# Patient Record
Sex: MALE | HISPANIC OR LATINO | Employment: FULL TIME | URBAN - METROPOLITAN AREA
[De-identification: names, ages, dates, MRNs, and addresses within clinical notes are randomized per-mention and may not be internally consistent; named-entity substitution may affect disease eponyms.]

---

## 2018-01-24 ENCOUNTER — NON-PROVIDER VISIT (OUTPATIENT)
Dept: OCCUPATIONAL MEDICINE | Facility: CLINIC | Age: 23
End: 2018-01-24

## 2018-01-24 DIAGNOSIS — Z02.1 DRUG TESTING, PRE-EMPLOYMENT: ICD-10-CM

## 2018-01-24 DIAGNOSIS — Z02.1 PRE-EMPLOYMENT DRUG SCREENING: ICD-10-CM

## 2018-01-24 LAB
AMP AMPHETAMINE: NORMAL
BAR BARBITURATES: NORMAL
BZO BENZODIAZEPINES: NORMAL
COC COCAINE: NORMAL
INT CON NEG: NORMAL
INT CON POS: NORMAL
MDMA ECSTASY: NORMAL
MET METHAMPHETAMINES: NORMAL
MTD METHADONE: NORMAL
OPI OPIATES: NORMAL
OXY OXYCODONE: NORMAL
PCP PHENCYCLIDINE: NORMAL
POC URINE DRUG SCREEN OCDRS: NEGATIVE
THC: NORMAL

## 2018-01-24 PROCEDURE — 80305 DRUG TEST PRSMV DIR OPT OBS: CPT | Performed by: PREVENTIVE MEDICINE

## 2019-11-20 ENCOUNTER — NON-PROVIDER VISIT (OUTPATIENT)
Dept: OCCUPATIONAL MEDICINE | Facility: CLINIC | Age: 24
End: 2019-11-20

## 2019-11-20 DIAGNOSIS — Z02.1 PRE-EMPLOYMENT DRUG SCREENING: ICD-10-CM

## 2019-11-20 LAB
AMP AMPHETAMINE: NORMAL
COC COCAINE: NORMAL
INT CON NEG: NORMAL
INT CON POS: NORMAL
MET METHAMPHETAMINES: NORMAL
OPI OPIATES: NORMAL
PCP PHENCYCLIDINE: NORMAL
POC DRUG COMMENT 753798-OCCUPATIONAL HEALTH: NORMAL
THC: NORMAL

## 2019-11-20 PROCEDURE — 80305 DRUG TEST PRSMV DIR OPT OBS: CPT | Performed by: NURSE PRACTITIONER

## 2021-02-17 ENCOUNTER — NON-PROVIDER VISIT (OUTPATIENT)
Dept: OCCUPATIONAL MEDICINE | Facility: CLINIC | Age: 26
End: 2021-02-17

## 2021-02-17 DIAGNOSIS — Z02.1 PRE-EMPLOYMENT DRUG SCREENING: ICD-10-CM

## 2021-02-17 PROCEDURE — 80305 DRUG TEST PRSMV DIR OPT OBS: CPT | Performed by: PREVENTIVE MEDICINE

## 2021-02-18 LAB
AMP AMPHETAMINE: NORMAL
BAR BARBITURATES: NORMAL
BZO BENZODIAZEPINES: NORMAL
COC COCAINE: NORMAL
INT CON NEG: NORMAL
INT CON POS: NORMAL
MDMA ECSTASY: NORMAL
MET METHAMPHETAMINES: NORMAL
MTD METHADONE: NORMAL
OPI OPIATES: NORMAL
OXY OXYCODONE: NORMAL
PCP PHENCYCLIDINE: NORMAL
POC URINE DRUG SCREEN OCDRS: NORMAL
THC: NORMAL

## 2021-03-12 ENCOUNTER — APPOINTMENT (OUTPATIENT)
Dept: URGENT CARE | Facility: CLINIC | Age: 26
End: 2021-03-12

## 2021-03-16 ENCOUNTER — NURSE TRIAGE (OUTPATIENT)
Dept: HEALTH INFORMATION MANAGEMENT | Facility: OTHER | Age: 26
End: 2021-03-16

## 2021-03-16 ENCOUNTER — APPOINTMENT (OUTPATIENT)
Dept: RADIOLOGY | Facility: MEDICAL CENTER | Age: 26
End: 2021-03-16
Attending: EMERGENCY MEDICINE
Payer: MEDICAID

## 2021-03-16 ENCOUNTER — HOSPITAL ENCOUNTER (EMERGENCY)
Facility: MEDICAL CENTER | Age: 26
End: 2021-03-16
Attending: EMERGENCY MEDICINE
Payer: MEDICAID

## 2021-03-16 VITALS
OXYGEN SATURATION: 98 % | SYSTOLIC BLOOD PRESSURE: 123 MMHG | RESPIRATION RATE: 16 BRPM | WEIGHT: 228.62 LBS | HEIGHT: 71 IN | DIASTOLIC BLOOD PRESSURE: 70 MMHG | BODY MASS INDEX: 32.01 KG/M2 | TEMPERATURE: 97.8 F | HEART RATE: 61 BPM

## 2021-03-16 DIAGNOSIS — M25.561 ACUTE PAIN OF RIGHT KNEE: ICD-10-CM

## 2021-03-16 PROCEDURE — 73564 X-RAY EXAM KNEE 4 OR MORE: CPT | Mod: RT

## 2021-03-16 PROCEDURE — 99283 EMERGENCY DEPT VISIT LOW MDM: CPT | Mod: EDC

## 2021-03-16 PROCEDURE — 302875 HCHG BANDAGE ACE 4 OR 6"": Mod: EDC

## 2021-03-16 NOTE — DISCHARGE INSTRUCTIONS
You were seen in the Emergency Department for knee injury.    X-rays showed evidence of fluid in the knee which could indicate ligament injury however there was no evidence of fracture.    Please use 1,000mg of tylenol or 600mg of ibuprofen every 6 hours as needed for pain.  Elevate and ice is much as possible.    Please follow up with orthopedic surgery for outpatient MRI.    Return to the Emergency Department with uncontrolled pain, new numbness or weakness in extremity, or other concerns.

## 2021-03-16 NOTE — ED PROVIDER NOTES
"ED Provider Note    Scribed for Niharika Castro M.D. by Jonathan Aguillon. 3/16/2021  1:09 PM    Means of arrival: Walk-In  History obtained from: Patient  History limited by: None      CHIEF COMPLAINT  Chief Complaint   Patient presents with   • Knee Pain     Right knee pain after wrestling 6 days ago       BÁRBARA Bledsoe is a 25 y.o. male who presents to the Emergency Department for evaluation of right knee pain onset six days ago. Patient states that as he was doing [employer], a teammate grabbed him from behind and they both fell backwards, causing the patient to injure his right knee. Patient states that his symptoms have not improved since onset, prompting him to present to the ED for further evaluation. Patient has associated swelling and decreased range of motion secondary to pain. Denies any numbness or tingling. No alleviating or exacerbating factors reported. Patient has no known allergies.     REVIEW OF SYSTEMS  Pertinent positive include right knee pain, swelling and decreased range of motion secondary to pain. Pertinent negative include numbness or tingling. All other systems reviewed and are negative.    PAST MEDICAL HISTORY  None noted    SOCIAL HISTORY  Social History     Tobacco Use   • Smoking status: Current Every Day Smoker   • Smokeless tobacco: Never Used   Substance and Sexual Activity   • Alcohol use: Not Currently     SURGICAL HISTORY  patient denies any surgical history    CURRENT MEDICATIONS  Home Medications     Reviewed by Olvin Abad R.N. (Registered Nurse) on 03/16/21 at 1251  Med List Status: Not Addressed   Medication Last Dose Status   cetirizine (ZYRTEC) 10 MG TABS  Active                ALLERGIES  No Known Allergies    PHYSICAL EXAM   VITAL SIGNS: /67   Pulse (!) 58   Temp 36.5 °C (97.7 °F) (Temporal)   Resp 16   Ht 1.803 m (5' 11\")   Wt 104 kg (228 lb 9.9 oz)   SpO2 98%   BMI 31.89 kg/m²    Constitutional: Nontoxic appearing,  Alert in no " "apparent distress.  HENT: Normocephalic, Atraumatic. Bilateral external ears normal. Nose normal.  Moist mucous membranes.  Oropharynx clear.  Eyes: Pupils are equal and reactive. Conjunctiva normal.   Neck: Supple, full range of motion  Musculoskeletal: There is mild swelling noted to the right knee. There is mild bruising posterior to the knee. Range of motion of the right knee is limited secondary to pain but intact. Flexion and extension function, as well as motor and sensation, are intact distal to injury.   Skin: Warm, Dry.  No rash.   Neurologic: Alert and oriented x3. Moving all extremities spontaneously without focal deficits.  Psychiatric: Affect normal, Mood normal, Appears appropriate and not intoxicated.    DIAGNOSTIC STUDIES  RADIOLOGY  Personally reviewed by me  DX-KNEE COMPLETE 4+ RIGHT   Final Result      No evidence of acute fracture or dislocation.      Moderate joint effusion.      Further evaluation can be obtained with MRI.                ED COURSE  Vitals:    03/16/21 1239 03/16/21 1249 03/16/21 1358   BP: 127/67  123/70   Pulse: (!) 58  61   Resp: 16  16   Temp: 36.5 °C (97.7 °F)  36.6 °C (97.8 °F)   TempSrc: Temporal  Temporal   SpO2: 98%  98%   Weight:  104 kg (228 lb 9.9 oz)    Height: 1.829 m (6') 1.803 m (5' 11\")          Medications administered:  Medications - No data to display    1:09 PM Patient seen and examined at bedside. The patient presents with right knee pain.  Ordered for DX-knee right to evaluate.     MEDICAL DECISION MAKING  Young otherwise healthy patient who presents with injury to right knee which occurred approximately a week ago.  No other traumatic injuries are identified.  There is no evidence of neurovascular compromise or concern for infectious process such as septic arthritis.  His x-rays do not show fracture however he does have a moderate joint effusion that may be due to ligamentous injury.  Patient will be discharged with symptomatic care and instructions for " outpatient follow-up for MRI and orthopedic follow-up.    1:38 PM - Upon reassessment, patient is resting comfortably with normal vital signs. No new complaints at this time.  Discussed results with patient and/or family as well as importance of primary care/orthopedic follow up. Patient understands plan of care and strict return precautions for new or changing symptoms.     The patient will return for new or worsening symptoms and is stable at the time of discharge.    The patient is referred to a primary physician for blood pressure management, diabetic screening, and for all other preventative health concerns.    DISPOSITION:  Patient will be discharged home in stable condition.    FOLLOW UP:  Tone Sutton M.D.  [address]  [facility] [address]  [phone]    Schedule an appointment as soon as possible for a visit   ORTHOPEDIC FOLLOW UP    [facility], Emergency Dept  [address] [facility] [id number] [phone]    If symptoms worsen      IMPRESSION  (M25.561) Acute pain of right knee    Results, diagnoses, and treatment options were discussed with the patient and/or family. Patient verbalized understanding of plan of care.     Jonathan PEARCE (Lena[place]sage), am scribing for, and in the presence of, Niharika Castro M.D..    Electronically signed by: Jonathan Aguillon ([place]), 3/16/2021    Niharika PEARCE M.D. personally performed the services described in this documentation, as scribed by Jonathan Aguillon in my presence, and it is both accurate and complete.    E     The note accurately reflects work and decisions made by me.  Niharika Castro M.D.  3/16/2021  6:04 PM

## 2021-03-16 NOTE — ED TRIAGE NOTES
"Chief Complaint   Patient presents with   • Knee Pain     Right knee pain after wrestling 6 days ago     /67   Pulse (!) 58   Temp 36.5 °C (97.7 °F) (Temporal)   Resp 16   Ht 1.803 m (5' 11\")   Wt 104 kg (228 lb 9.9 oz)   SpO2 98%   BMI 31.89 kg/m²     26 y/o male presents to ED with complaint of right knee pain. Patient states he was wresting a week ago and his knee got pinned in,'a weird position'.  He states he thought pain would go away. Has not taken any OTC analgesics. Able to ambulate, but has pain.    "

## 2021-03-16 NOTE — ED NOTES
First interaction with patient.  Assumed care at this time.  Patient presents to the ER today for complaint of right knee pain x6 days after falling on it while doing jiu jitsu.  Swelling noted on assessment.  He states that pain intermittently radiates to his calf and heel.  CMS intact.    Gown provided, patient instructed to changed prior to ERP evaluation.  NPO status explained by this RN.  Call light provided.  Chart up for ERP.    This RN provided education to patient about the importance of keeping mask in place over both mouth and nose for entire duration of ER visit.

## 2021-03-16 NOTE — Clinical Note
Raymond Bledsoe was seen and treated in our emergency department on 3/16/2021.  He may return to work on 03/30/2021.       If you have any questions or concerns, please don't hesitate to call.      Niharika Castro M.D.

## 2021-03-16 NOTE — TELEPHONE ENCOUNTER
"Does not have insurance.      Landed on knee, knee pain.          Reason for Disposition  • SEVERE pain (e.g., excruciating)    Additional Information  • Negative: Major bleeding (actively dripping or spurting) that can't be stopped  • Negative: Bullet, stabbed by knife or other serious penetrating wound  • Negative: Looks like a dislocated joint (crooked or deformed)  • Negative: Sounds like a life-threatening emergency to the triager  • Negative: Wound looks infected  • Negative: Can't stand (bear weight) or walk  • Negative: Skin is split open or gaping (length > 1/2 inch or 12 mm)  • Negative: Bleeding won't stop after 10 minutes of direct pressure (using correct technique)  • Negative: Dirt in the wound and not removed after 15 minutes of scrubbing  • Negative: Sounds like a serious injury to the triager  • Negative: Looks infected (e.g., spreading redness, pus, red streak)    Answer Assessment - Initial Assessment Questions  1. MECHANISM: \"How did the injury happen?\" (e.g., twisting injury, direct blow)       Wrestling, landed on knee  2. ONSET: \"When did the injury happen?\" (Minutes or hours ago)     3/10 @ 7:30 p.m.  3. LOCATION: \"Where is the injury located?\"       Right knee, entire knee  4. APPEARANCE of INJURY: \"What does the injury look like?\"       Swollen, bruising  5. SEVERITY: \"Can you put weight on that leg?\" \"Can you walk?\"       Can put weight for a limited amount of time, can barely bend knee, cannot stretch knee out or lift it up, can walk but limps  6. SIZE: For cuts, bruises, or swelling, ask: \"How large is it?\" (e.g., inches or centimeters;  entire joint)       Bruise is size of golf or tennis ball, 1 1/2 times of other knee  7. PAIN: \"Is there pain?\" If so, ask: \"How bad is the pain?\"    (e.g., Scale 1-10; or mild, moderate, severe)      Painful, 9  8. TETANUS: For any breaks in the skin, ask: \"When was the last tetanus booster?\"      NA  9. OTHER SYMPTOMS: \"Do you have any other symptoms?\" " " (e.g., \"pop\" when knee injured, swelling, locking, buckling)       no  10. PREGNANCY: \"Is there any chance you are pregnant?\" \"When was your last menstrual period?\"        NA    Protocols used: KNEE INJURY-A-OH      "

## 2021-03-16 NOTE — ED NOTES
"Raymond Bledsoe has been discharged from the Emergency Room.    Discharge instructions, which include signs and symptoms to monitor at home for, as well as detailed information regarding acute pain of right knee provided.  All questions and concerns addressed at this time.      Patient encouraged to follow up with orthopedics, Dr. More's office contact information with phone number and address provided.    Patient leaves ER in no apparent distress. This RN provided education regarding returning to the ER for any new concerns or changes in patient's condition.      /70   Pulse 61   Temp 36.6 °C (97.8 °F) (Temporal)   Resp 16   Ht 1.803 m (5' 11\")   Wt 104 kg (228 lb 9.9 oz)   SpO2 98%   BMI 31.89 kg/m²     "

## 2021-05-13 ENCOUNTER — APPOINTMENT (OUTPATIENT)
Dept: LAB | Facility: MEDICAL CENTER | Age: 26
End: 2021-05-13
Payer: MEDICAID

## 2021-05-19 ENCOUNTER — APPOINTMENT (OUTPATIENT)
Dept: ADMISSIONS | Facility: MEDICAL CENTER | Age: 26
End: 2021-05-19
Payer: MEDICAID

## 2021-05-20 ENCOUNTER — PRE-ADMISSION TESTING (OUTPATIENT)
Dept: ADMISSIONS | Facility: MEDICAL CENTER | Age: 26
End: 2021-05-20
Attending: ORTHOPAEDIC SURGERY
Payer: MEDICAID

## 2021-05-20 NOTE — PREPROCEDURE INSTRUCTIONS
"Patient present for preadmit appointment: \"Preparing for your Procedure information,\" pain management, patient safety, covid symptoms, self isolating and fasting protocol per anesthesia sheets given to patient with verbal and written instructions. Patient instructed to continue prescribed medications through the day before surgery, instructed to take the following medications the day of surgery per anesthesia protocol: none. Patient instructed to stop blood thinner per MD instruction: n/a. Patient states no reaction to previous anesthesia. Pt reports no s/s of urinary tract infection. Covid appointment scheduled 6/1. Patient educated to call MD's office if any symptoms of Covid appear. Patient understands education and has no other questions or concerns at this time.     Patient coming in on testing line 6/1.     Very difficult for PAR and I to hear patient but patient stated multiple times he did not want to reschedule our Preadmit appointment. Conducted appointment and education to the best of our ability. Sent an email with all education and important reminders to the email on file and educated to call with any questions.   "

## 2021-06-01 ENCOUNTER — APPOINTMENT (OUTPATIENT)
Dept: ADMISSIONS | Facility: MEDICAL CENTER | Age: 26
End: 2021-06-01
Attending: ORTHOPAEDIC SURGERY
Payer: MEDICAID

## 2021-06-01 DIAGNOSIS — Z01.810 PRE-OPERATIVE CARDIOVASCULAR EXAMINATION: ICD-10-CM

## 2021-06-01 DIAGNOSIS — Z01.812 PRE-OPERATIVE LABORATORY EXAMINATION: ICD-10-CM

## 2021-06-01 LAB — EKG IMPRESSION: NORMAL

## 2021-06-01 PROCEDURE — 93005 ELECTROCARDIOGRAM TRACING: CPT

## 2021-06-01 PROCEDURE — U0003 INFECTIOUS AGENT DETECTION BY NUCLEIC ACID (DNA OR RNA); SEVERE ACUTE RESPIRATORY SYNDROME CORONAVIRUS 2 (SARS-COV-2) (CORONAVIRUS DISEASE [COVID-19]), AMPLIFIED PROBE TECHNIQUE, MAKING USE OF HIGH THROUGHPUT TECHNOLOGIES AS DESCRIBED BY CMS-2020-01-R: HCPCS

## 2021-06-01 PROCEDURE — 93010 ELECTROCARDIOGRAM REPORT: CPT | Performed by: INTERNAL MEDICINE

## 2021-06-01 PROCEDURE — U0005 INFEC AGEN DETEC AMPLI PROBE: HCPCS

## 2021-06-01 PROCEDURE — C9803 HOPD COVID-19 SPEC COLLECT: HCPCS

## 2021-06-02 LAB
SARS-COV-2 RNA RESP QL NAA+PROBE: NOTDETECTED
SPECIMEN SOURCE: NORMAL

## 2021-06-04 ENCOUNTER — ANESTHESIA (OUTPATIENT)
Dept: SURGERY | Facility: MEDICAL CENTER | Age: 26
End: 2021-06-04
Payer: MEDICAID

## 2021-06-04 ENCOUNTER — HOSPITAL ENCOUNTER (OUTPATIENT)
Facility: MEDICAL CENTER | Age: 26
End: 2021-06-04
Attending: ORTHOPAEDIC SURGERY | Admitting: ORTHOPAEDIC SURGERY
Payer: MEDICAID

## 2021-06-04 ENCOUNTER — ANESTHESIA EVENT (OUTPATIENT)
Dept: SURGERY | Facility: MEDICAL CENTER | Age: 26
End: 2021-06-04
Payer: MEDICAID

## 2021-06-04 VITALS
HEART RATE: 59 BPM | BODY MASS INDEX: 30.1 KG/M2 | SYSTOLIC BLOOD PRESSURE: 126 MMHG | TEMPERATURE: 97.7 F | WEIGHT: 215 LBS | OXYGEN SATURATION: 97 % | DIASTOLIC BLOOD PRESSURE: 71 MMHG | HEIGHT: 71 IN | RESPIRATION RATE: 16 BRPM

## 2021-06-04 PROCEDURE — 160009 HCHG ANES TIME/MIN: Performed by: ORTHOPAEDIC SURGERY

## 2021-06-04 PROCEDURE — 160046 HCHG PACU - 1ST 60 MINS PHASE II: Performed by: ORTHOPAEDIC SURGERY

## 2021-06-04 PROCEDURE — A9270 NON-COVERED ITEM OR SERVICE: HCPCS | Performed by: ANESTHESIOLOGY

## 2021-06-04 PROCEDURE — 700101 HCHG RX REV CODE 250: Performed by: ANESTHESIOLOGY

## 2021-06-04 PROCEDURE — 160048 HCHG OR STATISTICAL LEVEL 1-5: Performed by: ORTHOPAEDIC SURGERY

## 2021-06-04 PROCEDURE — 502580 HCHG PACK, KNEE ARTHROSCOPY: Performed by: ORTHOPAEDIC SURGERY

## 2021-06-04 PROCEDURE — 160025 RECOVERY II MINUTES (STATS): Performed by: ORTHOPAEDIC SURGERY

## 2021-06-04 PROCEDURE — 160002 HCHG RECOVERY MINUTES (STAT): Performed by: ORTHOPAEDIC SURGERY

## 2021-06-04 PROCEDURE — 160029 HCHG SURGERY MINUTES - 1ST 30 MINS LEVEL 4: Performed by: ORTHOPAEDIC SURGERY

## 2021-06-04 PROCEDURE — 160035 HCHG PACU - 1ST 60 MINS PHASE I: Performed by: ORTHOPAEDIC SURGERY

## 2021-06-04 PROCEDURE — 501838 HCHG SUTURE GENERAL: Performed by: ORTHOPAEDIC SURGERY

## 2021-06-04 PROCEDURE — 700111 HCHG RX REV CODE 636 W/ 250 OVERRIDE (IP): Performed by: ANESTHESIOLOGY

## 2021-06-04 PROCEDURE — 700101 HCHG RX REV CODE 250: Performed by: ORTHOPAEDIC SURGERY

## 2021-06-04 PROCEDURE — 700102 HCHG RX REV CODE 250 W/ 637 OVERRIDE(OP): Performed by: ANESTHESIOLOGY

## 2021-06-04 PROCEDURE — 700105 HCHG RX REV CODE 258: Performed by: ORTHOPAEDIC SURGERY

## 2021-06-04 RX ORDER — HALOPERIDOL 5 MG/ML
1 INJECTION INTRAMUSCULAR
Status: DISCONTINUED | OUTPATIENT
Start: 2021-06-04 | End: 2021-06-04 | Stop reason: HOSPADM

## 2021-06-04 RX ORDER — OXYCODONE HCL 5 MG/5 ML
10 SOLUTION, ORAL ORAL
Status: DISCONTINUED | OUTPATIENT
Start: 2021-06-04 | End: 2021-06-04 | Stop reason: HOSPADM

## 2021-06-04 RX ORDER — ONDANSETRON 2 MG/ML
4 INJECTION INTRAMUSCULAR; INTRAVENOUS
Status: DISCONTINUED | OUTPATIENT
Start: 2021-06-04 | End: 2021-06-04 | Stop reason: HOSPADM

## 2021-06-04 RX ORDER — MEPERIDINE HYDROCHLORIDE 25 MG/ML
12.5 INJECTION INTRAMUSCULAR; INTRAVENOUS; SUBCUTANEOUS
Status: DISCONTINUED | OUTPATIENT
Start: 2021-06-04 | End: 2021-06-04 | Stop reason: HOSPADM

## 2021-06-04 RX ORDER — HYDRALAZINE HYDROCHLORIDE 20 MG/ML
5 INJECTION INTRAMUSCULAR; INTRAVENOUS
Status: DISCONTINUED | OUTPATIENT
Start: 2021-06-04 | End: 2021-06-04 | Stop reason: HOSPADM

## 2021-06-04 RX ORDER — ACETAMINOPHEN 500 MG
TABLET ORAL
Status: DISCONTINUED
Start: 2021-06-04 | End: 2021-06-04 | Stop reason: HOSPADM

## 2021-06-04 RX ORDER — BUPIVACAINE HYDROCHLORIDE AND EPINEPHRINE 2.5; 5 MG/ML; UG/ML
INJECTION, SOLUTION EPIDURAL; INFILTRATION; INTRACAUDAL; PERINEURAL
Status: DISCONTINUED | OUTPATIENT
Start: 2021-06-04 | End: 2021-06-04 | Stop reason: HOSPADM

## 2021-06-04 RX ORDER — HYDROMORPHONE HYDROCHLORIDE 1 MG/ML
0.2 INJECTION, SOLUTION INTRAMUSCULAR; INTRAVENOUS; SUBCUTANEOUS
Status: DISCONTINUED | OUTPATIENT
Start: 2021-06-04 | End: 2021-06-04 | Stop reason: HOSPADM

## 2021-06-04 RX ORDER — SODIUM CHLORIDE, SODIUM LACTATE, POTASSIUM CHLORIDE, CALCIUM CHLORIDE 600; 310; 30; 20 MG/100ML; MG/100ML; MG/100ML; MG/100ML
INJECTION, SOLUTION INTRAVENOUS CONTINUOUS
Status: DISCONTINUED | OUTPATIENT
Start: 2021-06-04 | End: 2021-06-04 | Stop reason: HOSPADM

## 2021-06-04 RX ORDER — DEXAMETHASONE SODIUM PHOSPHATE 4 MG/ML
INJECTION, SOLUTION INTRA-ARTICULAR; INTRALESIONAL; INTRAMUSCULAR; INTRAVENOUS; SOFT TISSUE PRN
Status: DISCONTINUED | OUTPATIENT
Start: 2021-06-04 | End: 2021-06-04 | Stop reason: SURG

## 2021-06-04 RX ORDER — DIPHENHYDRAMINE HYDROCHLORIDE 50 MG/ML
6.25 INJECTION INTRAMUSCULAR; INTRAVENOUS
Status: DISCONTINUED | OUTPATIENT
Start: 2021-06-04 | End: 2021-06-04 | Stop reason: HOSPADM

## 2021-06-04 RX ORDER — CEFAZOLIN SODIUM 1 G/3ML
INJECTION, POWDER, FOR SOLUTION INTRAMUSCULAR; INTRAVENOUS PRN
Status: DISCONTINUED | OUTPATIENT
Start: 2021-06-04 | End: 2021-06-04 | Stop reason: SURG

## 2021-06-04 RX ORDER — HYDROMORPHONE HYDROCHLORIDE 1 MG/ML
0.1 INJECTION, SOLUTION INTRAMUSCULAR; INTRAVENOUS; SUBCUTANEOUS
Status: DISCONTINUED | OUTPATIENT
Start: 2021-06-04 | End: 2021-06-04 | Stop reason: HOSPADM

## 2021-06-04 RX ORDER — ACETAMINOPHEN 500 MG
1000 TABLET ORAL ONCE
Status: COMPLETED | OUTPATIENT
Start: 2021-06-04 | End: 2021-06-04

## 2021-06-04 RX ORDER — HYDROMORPHONE HYDROCHLORIDE 1 MG/ML
0.4 INJECTION, SOLUTION INTRAMUSCULAR; INTRAVENOUS; SUBCUTANEOUS
Status: DISCONTINUED | OUTPATIENT
Start: 2021-06-04 | End: 2021-06-04 | Stop reason: HOSPADM

## 2021-06-04 RX ORDER — ONDANSETRON 2 MG/ML
INJECTION INTRAMUSCULAR; INTRAVENOUS PRN
Status: DISCONTINUED | OUTPATIENT
Start: 2021-06-04 | End: 2021-06-04 | Stop reason: SURG

## 2021-06-04 RX ORDER — MIDAZOLAM HYDROCHLORIDE 1 MG/ML
INJECTION INTRAMUSCULAR; INTRAVENOUS PRN
Status: DISCONTINUED | OUTPATIENT
Start: 2021-06-04 | End: 2021-06-04 | Stop reason: SURG

## 2021-06-04 RX ORDER — LIDOCAINE HYDROCHLORIDE 20 MG/ML
INJECTION, SOLUTION EPIDURAL; INFILTRATION; INTRACAUDAL; PERINEURAL PRN
Status: DISCONTINUED | OUTPATIENT
Start: 2021-06-04 | End: 2021-06-04 | Stop reason: SURG

## 2021-06-04 RX ORDER — SODIUM CHLORIDE, SODIUM LACTATE, POTASSIUM CHLORIDE, CALCIUM CHLORIDE 600; 310; 30; 20 MG/100ML; MG/100ML; MG/100ML; MG/100ML
INJECTION, SOLUTION INTRAVENOUS CONTINUOUS
Status: ACTIVE | OUTPATIENT
Start: 2021-06-04 | End: 2021-06-04

## 2021-06-04 RX ORDER — CELECOXIB 200 MG/1
200 CAPSULE ORAL ONCE
Status: COMPLETED | OUTPATIENT
Start: 2021-06-04 | End: 2021-06-04

## 2021-06-04 RX ORDER — OXYCODONE HCL 5 MG/5 ML
5 SOLUTION, ORAL ORAL
Status: DISCONTINUED | OUTPATIENT
Start: 2021-06-04 | End: 2021-06-04 | Stop reason: HOSPADM

## 2021-06-04 RX ADMIN — LIDOCAINE HYDROCHLORIDE 60 MG: 20 INJECTION, SOLUTION EPIDURAL; INFILTRATION; INTRACAUDAL; PERINEURAL at 14:14

## 2021-06-04 RX ADMIN — MIDAZOLAM HYDROCHLORIDE 2 MG: 1 INJECTION, SOLUTION INTRAMUSCULAR; INTRAVENOUS at 14:11

## 2021-06-04 RX ADMIN — PROPOFOL 200 MG: 10 INJECTION, EMULSION INTRAVENOUS at 14:14

## 2021-06-04 RX ADMIN — CEFAZOLIN 2 G: 1 INJECTION, POWDER, FOR SOLUTION INTRAVENOUS at 14:14

## 2021-06-04 RX ADMIN — FENTANYL CITRATE 50 MCG: 50 INJECTION, SOLUTION INTRAMUSCULAR; INTRAVENOUS at 14:20

## 2021-06-04 RX ADMIN — CELECOXIB 200 MG: 200 CAPSULE ORAL at 10:35

## 2021-06-04 RX ADMIN — SODIUM CHLORIDE, POTASSIUM CHLORIDE, SODIUM LACTATE AND CALCIUM CHLORIDE: 600; 310; 30; 20 INJECTION, SOLUTION INTRAVENOUS at 10:32

## 2021-06-04 RX ADMIN — WATER 15 ML: 100 IRRIGANT IRRIGATION at 10:31

## 2021-06-04 RX ADMIN — ACETAMINOPHEN 1000 MG: 500 TABLET ORAL at 10:39

## 2021-06-04 RX ADMIN — ONDANSETRON 4 MG: 2 INJECTION INTRAMUSCULAR; INTRAVENOUS at 14:17

## 2021-06-04 RX ADMIN — DEXAMETHASONE SODIUM PHOSPHATE 8 MG: 4 INJECTION, SOLUTION INTRAMUSCULAR; INTRAVENOUS at 14:16

## 2021-06-04 RX ADMIN — FENTANYL CITRATE 100 MCG: 50 INJECTION, SOLUTION INTRAMUSCULAR; INTRAVENOUS at 14:14

## 2021-06-04 ASSESSMENT — PAIN DESCRIPTION - PAIN TYPE
TYPE: SURGICAL PAIN
TYPE: CHRONIC PAIN
TYPE: SURGICAL PAIN

## 2021-06-04 ASSESSMENT — PAIN SCALES - GENERAL: PAIN_LEVEL: 3

## 2021-06-04 NOTE — ANESTHESIA PREPROCEDURE EVALUATION
Healthy 26 y/o male with right knee injury following wrestling, no FH of anesthesia problems, no significant PMH, no substance abuse. smoker    Relevant Problems   No relevant active problems       Physical Exam    Airway   Mallampati: II  TM distance: >3 FB  Neck ROM: full       Cardiovascular - normal exam  Rhythm: regular  Rate: normal  (-) murmur     Dental - normal exam           Pulmonary - normal exam  Breath sounds clear to auscultation     Abdominal    Neurological - normal exam                 Anesthesia Plan    ASA 2       Plan - general       Airway plan will be LMA          Induction: intravenous      Pertinent diagnostic labs and testing reviewed    Informed Consent:    Anesthetic plan and risks discussed with patient.

## 2021-06-04 NOTE — ANESTHESIA TIME REPORT
Anesthesia Start and Stop Event Times     Date Time Event    6/4/2021 1348 Ready for Procedure     1410 Anesthesia Start     1438 Anesthesia Stop        Responsible Staff  06/04/21    Name Role Begin End    Pete Maier M.D. Anesth 1410 1438        Preop Diagnosis (Free Text):  Pre-op Diagnosis     OTHER TEAR OF LATERAL MENISCUS, PAIN IN RIGHT KNEE        Preop Diagnosis (Codes):    Post op Diagnosis  Right knee pain      Premium Reason  Non-Premium    Comments:

## 2021-06-04 NOTE — OR NURSING
1530: Report received from Coleen HOLDEN. Pt to Stage 2 from PACU via [facility]. Assisted to get dressed by CNA. VSS.     1555: DC instructions reviewed with pt and pt's mom.     1600: PIV DC'd by RN.     1618: Pt discharged via wheelchair by CNa. All belongings with pt.

## 2021-06-04 NOTE — DISCHARGE INSTRUCTIONS
ACTIVITY: Rest and take it easy for the first 24 hours.  A responsible adult is recommended to remain with you during that time.  It is normal to feel sleepy.  We encourage you to not do anything that requires balance, judgment or coordination.    MILD FLU-LIKE SYMPTOMS ARE NORMAL. YOU MAY EXPERIENCE GENERALIZED MUSCLE ACHES, THROAT IRRITATION, HEADACHE AND/OR SOME NAUSEA.    FOR 24 HOURS DO NOT:  Drive, operate machinery or run household appliances.  Drink beer or alcoholic beverages.   Make important decisions or sign legal documents.    SPECIAL INSTRUCTIONS:   Ice and elevate extremity. WBAT crutches for comfort.    DIET: To avoid nausea, slowly advance diet as tolerated, avoiding spicy or greasy foods for the first day.  Add more substantial food to your diet according to your physician's instructions.  Babies can be fed formula or breast milk as soon as they are hungry.  INCREASE FLUIDS AND FIBER TO AVOID CONSTIPATION.      SURGICAL DRESSING/BATHING: May remove dressings Post op Day #2 and Shower with wound uncovered.  Apply bandaids after shower.  Do not soak or submerge incisions for two weeks    FOLLOW-UP APPOINTMENT:Follow up 7-10 days.  Call to schedule.    You should CALL YOUR PHYSICIAN if you develop:  Fever greater than 101 degrees F.  Pain not relieved by medication, or persistent nausea or vomiting.  Excessive bleeding (blood soaking through dressing) or unexpected drainage from the wound.  Extreme redness or swelling around the incision site, drainage of pus or foul smelling drainage.  Inability to urinate or empty your bladder within 8 hours.  Problems with breathing or chest pain.    You should call 911 if you develop problems with breathing or chest pain.  If you are unable to contact your doctor or surgical center, you should go to the nearest emergency room or urgent care center.  Physician's telephone #: [phone]    If any questions arise, call your doctor.  If your doctor is not  available, please feel free to call the Surgical Center at [phone]. The Contact Center is open Monday through Friday 7AM to 5PM and may speak to a nurse at [phone], or toll free at [phone].     A registered nurse may call you a few days after your surgery to see how you are doing after your procedure.    MEDICATIONS: Resume taking daily medication.  Take prescribed pain medication with food.  If no medication is prescribed, you may take non-aspirin pain medication if needed.  PAIN MEDICATION CAN BE VERY CONSTIPATING.  Take a stool softener or laxative such as senokot, pericolace, or milk of magnesia if needed.    Prescription given for Oxycodone.  Last pain medication given at __________________________.    If your physician has prescribed pain medication that includes Acetaminophen (Tylenol), do not take additional Acetaminophen (Tylenol) while taking the prescribed medication.    Depression / Suicide Risk    As you are discharged from this [facility] facility, it is important to learn how to keep safe from harming yourself.    Recognize the warning signs:  · Abrupt changes in personality, positive or negative- including increase in energy   · Giving away possessions  · Change in eating patterns- significant weight changes-  positive or negative  · Change in sleeping patterns- unable to sleep or sleeping all the time   · Unwillingness or inability to communicate  · Depression  · Unusual sadness, discouragement and loneliness  · Talk of wanting to die  · Neglect of personal appearance   · Rebelliousness- reckless behavior  · Withdrawal from people/activities they love  · Confusion- inability to concentrate     If you or a loved one observes any of these behaviors or has concerns about self-harm, here's what you can do:  · Talk about it- your feelings and reasons for harming yourself  · Remove any means that you might use to hurt yourself (examples: pills, rope, extension cords, firearm)  · Get  professional help from the community (Mental Health, Substance Abuse, psychological counseling)  · Do not be alone:Call your Safe Contact- someone whom you trust who will be there for you.  · Call your local CRISIS HOTLINE 250-9912 or 225-060-6801  · Call your local Children's Mobile Crisis Response Team [place] (627) 553-9616 or www.[employer]  · Call the toll free National Suicide Prevention Hotlines   · National Suicide Prevention Lifeline 558-109-JXCP (1515)  · National Hope Line Network 800-SUICIDE (362-5932)

## 2021-06-04 NOTE — ANESTHESIA PROCEDURE NOTES
Airway    Date/Time: 6/4/2021 2:14 PM  Performed by: Pete Maier M.D.  Authorized by: Pete Maier M.D.     Location:  OR  Urgency:  Elective  Indications for Airway Management:  Anesthesia      Spontaneous Ventilation: absent    Sedation Level:  Deep  Preoxygenated: Yes    Mask Difficulty Assessment:  1 - vent by mask  Final Airway Type:  Supraglottic airway  Final Supraglottic Airway:  Standard LMA    SGA Size:  4  Number of Attempts at Approach:  1

## 2021-06-04 NOTE — ANESTHESIA POSTPROCEDURE EVALUATION
Patient: Raymond Bledsoe    Procedure Summary     Date: 06/04/21 Room / Location:  OR  / SURGERY [facility]    Anesthesia Start: 1410 Anesthesia Stop: 1438    Procedures:       ARTHROSCOPY, KNEE (Right )      MENISCECTOMY, KNEE, MEDIAL - FOR PARTIAL LATERAL. Diagnosis: (OTHER TEAR OF LATERAL MENISCUS, PAIN IN RIGHT KNEE)    Surgeons: Iftikhar Quezada M.D. Responsible Provider: Pete Maier M.D.    Anesthesia Type: general ASA Status: 2          Final Anesthesia Type: general  Last vitals  BP   Blood Pressure: 133/69    Temp   36.7 °C (98.1 °F)    Pulse   (!) 55   Resp        SpO2   95 %      Anesthesia Post Evaluation    Patient location during evaluation: PACU  Patient participation: complete - patient participated  Level of consciousness: sleepy but conscious  Pain score: 3    Airway patency: patent  Anesthetic complications: no  Cardiovascular status: hemodynamically stable  Respiratory status: acceptable  Hydration status: euvolemic    PONV: none          No complications documented.

## 2021-06-04 NOTE — OR NURSING
1437  To PACU from OR via blanco, sleeping, respirations spontaneous and non-labored via OPA. Icepack applied over c/d/i right knee surgical dressings.    1442 pt waking, opa dc'd    1455 resting comfortably, vss    1510 awake, states pain tolerable @2/10    1520 called pt family with update    1530  Transferred to phase 2 in stable condition via blanco w/cna

## 2021-06-04 NOTE — OP REPORT
DATE OF SERVICE:  06/04/2021     PREOPERATIVE DIAGNOSIS:  Right knee lateral meniscus tear.     POSTOPERATIVE DIAGNOSES:  1.  Right knee complex lateral meniscus tear.  2.  Synovitis in the knee.     PROCEDURES:  1.  Right knee diagnostic arthroscopy with arthroscopic partial lateral   meniscectomy.  2.  Right knee arthroscopic synovectomy.     SURGEON:  Iftikhar Quezada MD     ASSISTANT:  Ira Wood PA-C.     ANESTHESIA:  General.     ANESTHESIOLOGIST:  Pete Maier MD     IMPLANTS:  None.     COMPLICATIONS:  None.     DISPOSITION:  Stable to postanesthesia care unit.     INDICATIONS:  The patient sustained right knee injury and has had progressive   mechanical symptoms of the knee.  The risks, benefits, alternatives,   limitations of surgical intervention were discussed in detail.  He expressed   understanding and desired to proceed.     DESCRIPTION OF PROCEDURE:  The patient and the correct operative extremity   were identified in the preoperative area.  The knee was marked.  He was   brought to the operating room where the correct operative extremity was again   confirmed.  He was placed supine on the OR table.  He underwent general   anesthesia without complication.  Examination under anesthesia showed full   range of motion, no instability.  Knee was prepped with alcohol and injected   with 30 mL of 1% lidocaine with epinephrine.  The knee was then prepped and   draped in the usual sterile fashion using ChloraPrep.  A diagnostic   arthroscopy was then performed, which showed intact articular cartilage of the   patellofemoral joint.  There was moderate synovitis in the knee.  The notch   showed an intact ACL and PCL.  The ACL was covered with a thickened ligamentum   mucosum.  The medial compartment showed an intact meniscus, intact cartilage.    The lateral compartment showed a very complex tear of the lateral meniscus   with a large flipped undersurface fragment and a large unstable flap fragment   of the  anterior horn.  Partial lateral meniscectomy was performed with the   duckbill resector and the arthroscopic shaver.  Synovectomy was performed,   removing the inflamed synovium from the suprapatellar pouch and medial and   lateral compartments. The retropatellar fat pad was excised _____ the large   engorged ligamentum mucosum.  Hemostasis was obtained with an Arthrocare wand.    The gutters were checked for loose bodies, none were identified.  A spinal   needle placed into the suprapatellar pouch under direct vision. The fluid   removed from the knee.  The scope was withdrawn.  The portals closed with 3-0   nylon.  The knee injected with 0.5% bupivacaine with epinephrine.  Sterile   dressings were applied.  Knee was loosely overwrapped with an Ace wrap.  The   patient was then transferred to his hospital cart allowed to awaken from   anesthesia and taken to the postanesthesia care unit in stable condition.  He   tolerated the procedure well.  There were no immediate complications.        ______________________________  Iftikhar Quezada MD RD/GILL    DD:  06/04/2021 14:38  DT:  06/04/2021 15:44    Job#:  [id number]

## 2021-06-08 ENCOUNTER — TELEPHONE (OUTPATIENT)
Dept: SCHEDULING | Facility: IMAGING CENTER | Age: 26
End: 2021-06-08

## 2021-06-08 SDOH — ECONOMIC STABILITY: HOUSING INSECURITY: IN THE LAST 12 MONTHS, HOW MANY PLACES HAVE YOU LIVED?: 3

## 2021-06-08 SDOH — ECONOMIC STABILITY: HOUSING INSECURITY
IN THE LAST 12 MONTHS, WAS THERE A TIME WHEN YOU DID NOT HAVE A STEADY PLACE TO SLEEP OR SLEPT IN A SHELTER (INCLUDING NOW)?: YES

## 2021-06-08 SDOH — ECONOMIC STABILITY: TRANSPORTATION INSECURITY
IN THE PAST 12 MONTHS, HAS LACK OF RELIABLE TRANSPORTATION KEPT YOU FROM MEDICAL APPOINTMENTS, MEETINGS, WORK OR FROM GETTING THINGS NEEDED FOR DAILY LIVING?: NO

## 2021-06-08 SDOH — ECONOMIC STABILITY: FOOD INSECURITY: WITHIN THE PAST 12 MONTHS, THE FOOD YOU BOUGHT JUST DIDN'T LAST AND YOU DIDN'T HAVE MONEY TO GET MORE.: SOMETIMES TRUE

## 2021-06-08 SDOH — ECONOMIC STABILITY: TRANSPORTATION INSECURITY
IN THE PAST 12 MONTHS, HAS LACK OF TRANSPORTATION KEPT YOU FROM MEETINGS, WORK, OR FROM GETTING THINGS NEEDED FOR DAILY LIVING?: NO

## 2021-06-08 SDOH — ECONOMIC STABILITY: INCOME INSECURITY: IN THE LAST 12 MONTHS, WAS THERE A TIME WHEN YOU WERE NOT ABLE TO PAY THE MORTGAGE OR RENT ON TIME?: YES

## 2021-06-08 SDOH — ECONOMIC STABILITY: TRANSPORTATION INSECURITY
IN THE PAST 12 MONTHS, HAS THE LACK OF TRANSPORTATION KEPT YOU FROM MEDICAL APPOINTMENTS OR FROM GETTING MEDICATIONS?: NO

## 2021-06-08 SDOH — ECONOMIC STABILITY: INCOME INSECURITY: HOW HARD IS IT FOR YOU TO PAY FOR THE VERY BASICS LIKE FOOD, HOUSING, MEDICAL CARE, AND HEATING?: HARD

## 2021-06-08 SDOH — ECONOMIC STABILITY: HOUSING INSECURITY
IN THE LAST 12 MONTHS, WAS THERE A TIME WHEN YOU DID NOT HAVE A STEADY PLACE TO SLEEP OR SLEPT IN A SHELTER (INCLUDING NOW)?: NO

## 2021-06-08 SDOH — ECONOMIC STABILITY: FOOD INSECURITY: WITHIN THE PAST 12 MONTHS, YOU WORRIED THAT YOUR FOOD WOULD RUN OUT BEFORE YOU GOT MONEY TO BUY MORE.: SOMETIMES TRUE

## 2021-06-08 SDOH — HEALTH STABILITY: PHYSICAL HEALTH: ON AVERAGE, HOW MANY MINUTES DO YOU ENGAGE IN EXERCISE AT THIS LEVEL?: 100 MIN

## 2021-06-08 SDOH — HEALTH STABILITY: MENTAL HEALTH
STRESS IS WHEN SOMEONE FEELS TENSE, NERVOUS, ANXIOUS, OR CAN'T SLEEP AT NIGHT BECAUSE THEIR MIND IS TROUBLED. HOW STRESSED ARE YOU?: TO SOME EXTENT

## 2021-06-08 SDOH — HEALTH STABILITY: PHYSICAL HEALTH: ON AVERAGE, HOW MANY DAYS PER WEEK DO YOU ENGAGE IN MODERATE TO STRENUOUS EXERCISE (LIKE A BRISK WALK)?: 6 DAYS

## 2021-06-08 ASSESSMENT — SOCIAL DETERMINANTS OF HEALTH (SDOH)
HOW OFTEN DO YOU ATTEND CHURCH OR RELIGIOUS SERVICES?: 1 TO 4 TIMES PER YEAR
HOW OFTEN DO YOU GET TOGETHER WITH FRIENDS OR RELATIVES?: TWICE A WEEK
ARE YOU MARRIED, WIDOWED, DIVORCED, SEPARATED, NEVER MARRIED, OR LIVING WITH A PARTNER?: NEVER MARRIED
HOW MANY DRINKS CONTAINING ALCOHOL DO YOU HAVE ON A TYPICAL DAY WHEN YOU ARE DRINKING: 5 OR 6
HOW OFTEN DO YOU ATTENT MEETINGS OF THE CLUB OR ORGANIZATION YOU BELONG TO?: PATIENT DECLINED
ARE YOU MARRIED, WIDOWED, DIVORCED, SEPARATED, NEVER MARRIED, OR LIVING WITH A PARTNER?: NEVER MARRIED
HOW OFTEN DO YOU GET TOGETHER WITH FRIENDS OR RELATIVES?: TWICE A WEEK
DO YOU BELONG TO ANY CLUBS OR ORGANIZATIONS SUCH AS CHURCH GROUPS UNIONS, FRATERNAL OR ATHLETIC GROUPS, OR SCHOOL GROUPS?: NO
HOW OFTEN DO YOU ATTENT MEETINGS OF THE CLUB OR ORGANIZATION YOU BELONG TO?: PATIENT DECLINED
HOW OFTEN DO YOU HAVE A DRINK CONTAINING ALCOHOL: MONTHLY OR LESS
IN A TYPICAL WEEK, HOW MANY TIMES DO YOU TALK ON THE PHONE WITH FAMILY, FRIENDS, OR NEIGHBORS?: MORE THAN THREE TIMES A WEEK
HOW OFTEN DO YOU HAVE SIX OR MORE DRINKS ON ONE OCCASION: LESS THAN MONTHLY
WITHIN THE PAST 12 MONTHS, YOU WORRIED THAT YOUR FOOD WOULD RUN OUT BEFORE YOU GOT THE MONEY TO BUY MORE: SOMETIMES TRUE
HOW HARD IS IT FOR YOU TO PAY FOR THE VERY BASICS LIKE FOOD, HOUSING, MEDICAL CARE, AND HEATING?: HARD
IN A TYPICAL WEEK, HOW MANY TIMES DO YOU TALK ON THE PHONE WITH FAMILY, FRIENDS, OR NEIGHBORS?: MORE THAN THREE TIMES A WEEK
DO YOU BELONG TO ANY CLUBS OR ORGANIZATIONS SUCH AS CHURCH GROUPS UNIONS, FRATERNAL OR ATHLETIC GROUPS, OR SCHOOL GROUPS?: NO
HOW OFTEN DO YOU ATTEND CHURCH OR RELIGIOUS SERVICES?: 1 TO 4 TIMES PER YEAR

## 2021-06-08 ASSESSMENT — LIFESTYLE VARIABLES
HOW OFTEN DO YOU HAVE SIX OR MORE DRINKS ON ONE OCCASION: LESS THAN MONTHLY
HOW OFTEN DO YOU HAVE A DRINK CONTAINING ALCOHOL: MONTHLY OR LESS
HOW MANY STANDARD DRINKS CONTAINING ALCOHOL DO YOU HAVE ON A TYPICAL DAY: 5 OR 6

## 2021-06-10 ENCOUNTER — OFFICE VISIT (OUTPATIENT)
Dept: MEDICAL GROUP | Facility: MEDICAL CENTER | Age: 26
End: 2021-06-10
Attending: INTERNAL MEDICINE
Payer: MEDICAID

## 2021-06-10 ENCOUNTER — HOSPITAL ENCOUNTER (OUTPATIENT)
Dept: LAB | Facility: MEDICAL CENTER | Age: 26
End: 2021-06-10
Attending: INTERNAL MEDICINE
Payer: MEDICAID

## 2021-06-10 VITALS
DIASTOLIC BLOOD PRESSURE: 70 MMHG | WEIGHT: 210 LBS | HEIGHT: 71 IN | SYSTOLIC BLOOD PRESSURE: 108 MMHG | BODY MASS INDEX: 29.4 KG/M2 | HEART RATE: 60 BPM | OXYGEN SATURATION: 99 % | TEMPERATURE: 97.7 F | RESPIRATION RATE: 16 BRPM

## 2021-06-10 DIAGNOSIS — M25.511 TRIGGER POINT OF RIGHT SHOULDER REGION: ICD-10-CM

## 2021-06-10 DIAGNOSIS — M77.02 MEDIAL EPICONDYLITIS OF LEFT ELBOW: ICD-10-CM

## 2021-06-10 DIAGNOSIS — K40.90 LEFT INGUINAL HERNIA: ICD-10-CM

## 2021-06-10 DIAGNOSIS — F33.1 MODERATE EPISODE OF RECURRENT MAJOR DEPRESSIVE DISORDER (HCC): ICD-10-CM

## 2021-06-10 DIAGNOSIS — Z11.3 SCREEN FOR STD (SEXUALLY TRANSMITTED DISEASE): ICD-10-CM

## 2021-06-10 DIAGNOSIS — Z62.810 PERSONAL HISTORY OF SEXUAL ABUSE IN CHILDHOOD: ICD-10-CM

## 2021-06-10 PROBLEM — M77.00 EPICONDYLITIS ELBOW, MEDIAL: Status: ACTIVE | Noted: 2021-06-10

## 2021-06-10 PROCEDURE — 99213 OFFICE O/P EST LOW 20 MIN: CPT | Performed by: INTERNAL MEDICINE

## 2021-06-10 PROCEDURE — 87591 N.GONORRHOEAE DNA AMP PROB: CPT

## 2021-06-10 PROCEDURE — 36415 COLL VENOUS BLD VENIPUNCTURE: CPT

## 2021-06-10 PROCEDURE — 20552 NJX 1/MLT TRIGGER POINT 1/2: CPT

## 2021-06-10 PROCEDURE — 87389 HIV-1 AG W/HIV-1&-2 AB AG IA: CPT

## 2021-06-10 PROCEDURE — 700101 HCHG RX REV CODE 250: Performed by: INTERNAL MEDICINE

## 2021-06-10 PROCEDURE — 87491 CHLMYD TRACH DNA AMP PROBE: CPT

## 2021-06-10 PROCEDURE — 86780 TREPONEMA PALLIDUM: CPT

## 2021-06-10 PROCEDURE — 20552 NJX 1/MLT TRIGGER POINT 1/2: CPT | Performed by: INTERNAL MEDICINE

## 2021-06-10 PROCEDURE — 99204 OFFICE O/P NEW MOD 45 MIN: CPT | Mod: 25 | Performed by: INTERNAL MEDICINE

## 2021-06-10 PROCEDURE — 86803 HEPATITIS C AB TEST: CPT

## 2021-06-10 RX ORDER — LIDOCAINE HYDROCHLORIDE 20 MG/ML
5 INJECTION, SOLUTION EPIDURAL; INFILTRATION; INTRACAUDAL; PERINEURAL ONCE
Status: COMPLETED | OUTPATIENT
Start: 2021-06-10 | End: 2021-06-10

## 2021-06-10 RX ORDER — OXYCODONE HYDROCHLORIDE 5 MG/1
5 TABLET ORAL EVERY 6 HOURS PRN
COMMUNITY
Start: 2021-06-05 | End: 2021-06-10

## 2021-06-10 RX ADMIN — LIDOCAINE HYDROCHLORIDE 5 ML: 20 INJECTION, SOLUTION EPIDURAL; INFILTRATION; INTRACAUDAL; PERINEURAL at 14:53

## 2021-06-10 ASSESSMENT — PATIENT HEALTH QUESTIONNAIRE - PHQ9
5. POOR APPETITE OR OVEREATING: 3 - NEARLY EVERY DAY
CLINICAL INTERPRETATION OF PHQ2 SCORE: 4
SUM OF ALL RESPONSES TO PHQ QUESTIONS 1-9: 24

## 2021-06-10 NOTE — ASSESSMENT & PLAN NOTE
Approximately 2 years ago he noticed a bulging in the left groin region.  He also notices enlargement of the left testicle.  He states that the area is sometimes painful.  He does very heavy weightlifting which is how he thinks this happened.  He has never had a formal evaluation by a surgeon.

## 2021-06-10 NOTE — ASSESSMENT & PLAN NOTE
He reports pain over the medial epicondyle area on the left.  Worse with weightlifting especially biceps curls.  He has been resting for the past week and has noticed some improvement.
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
## 2021-06-10 NOTE — ASSESSMENT & PLAN NOTE
He reports painful areas over the right trapezius and right rhomboid region that are very tender to the touch and feel like the muscle is getting tight and spasming.  He does a lot of heavy weightlifting which exacerbates the problem.  Pain is present off and on and has been going on for multiple weeks.
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
## 2021-06-11 LAB
C TRACH DNA SPEC QL NAA+PROBE: POSITIVE
HCV AB SER QL: NORMAL
HIV 1+2 AB+HIV1 P24 AG SERPL QL IA: NORMAL
N GONORRHOEA DNA SPEC QL NAA+PROBE: NEGATIVE
SPECIMEN SOURCE: ABNORMAL
TREPONEMA PALLIDUM IGG+IGM AB [PRESENCE] IN SERUM OR PLASMA BY IMMUNOASSAY: NORMAL

## 2021-06-12 ENCOUNTER — NURSE TRIAGE (OUTPATIENT)
Dept: HEALTH INFORMATION MANAGEMENT | Facility: OTHER | Age: 26
End: 2021-06-12
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
## 2021-06-14 ENCOUNTER — APPOINTMENT (OUTPATIENT)
Dept: MEDICAL GROUP | Facility: MEDICAL CENTER | Age: 26
End: 2021-06-14
Attending: FAMILY MEDICINE
Payer: MEDICAID

## 2021-06-14 ENCOUNTER — TELEPHONE (OUTPATIENT)
Dept: MEDICAL GROUP | Facility: MEDICAL CENTER | Age: 26
End: 2021-06-14

## 2021-06-14 RX ORDER — AZITHROMYCIN 500 MG/1
1000 TABLET, FILM COATED ORAL ONCE
Qty: 2 TABLET | Refills: 0 | Status: SHIPPED | OUTPATIENT
Start: 2021-06-14 | End: 2021-06-14

## 2021-06-14 NOTE — TELEPHONE ENCOUNTER
1. Caller Name: LAB      Call Back Number: [phone]      How would the patient prefer to be contacted with a response: Phone call do NOT leave a detailed message    Lab called with a positive chlamydia result.

## 2021-06-15 ENCOUNTER — NON-PROVIDER VISIT (OUTPATIENT)
Dept: MEDICAL GROUP | Facility: MEDICAL CENTER | Age: 26
End: 2021-06-15
Attending: PHYSICIAN ASSISTANT
Payer: MEDICAID

## 2021-06-15 ENCOUNTER — PHARMACY VISIT (OUTPATIENT)
Dept: PHARMACY | Facility: MEDICAL CENTER | Age: 26
End: 2021-06-15
Payer: COMMERCIAL

## 2021-06-15 DIAGNOSIS — A74.9 CHLAMYDIA: ICD-10-CM

## 2021-06-15 PROCEDURE — 700111 HCHG RX REV CODE 636 W/ 250 OVERRIDE (IP): Performed by: PHYSICIAN ASSISTANT

## 2021-06-15 PROCEDURE — 700101 HCHG RX REV CODE 250: Performed by: PHYSICIAN ASSISTANT

## 2021-06-15 PROCEDURE — 99999 PR NO CHARGE: CPT | Performed by: PHYSICIAN ASSISTANT

## 2021-06-15 PROCEDURE — RXMED WILLOW AMBULATORY MEDICATION CHARGE: Performed by: INTERNAL MEDICINE

## 2021-06-15 RX ORDER — AZITHROMYCIN 250 MG/1
TABLET, FILM COATED ORAL
Qty: 4 TABLET | Refills: 0 | Status: SHIPPED | OUTPATIENT
Start: 2021-06-15 | End: 2021-07-22

## 2021-06-15 RX ADMIN — LIDOCAINE HYDROCHLORIDE 500 MG: 10 INJECTION, SOLUTION EPIDURAL; INFILTRATION; INTRACAUDAL; PERINEURAL at 18:01
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
## 2021-06-17 NOTE — H&P
Surgery Orthopedic History & Physical Note    Date  6/17/2021    Primary Care Physician  Lulu Renner M.D.    CC  * No Diagnosis Codes entered *    HPI  This is a 25 y.o. male who presented with meniscal pathology.    Past Medical History:   Diagnosis Date   • Bradycardia     50's sometimes 51   • Depression        Past Surgical History:   Procedure Laterality Date   • PB KNEE SCOPE,DIAGNOSTIC Right 6/4/2021    Procedure: ARTHROSCOPY, KNEE;  Surgeon: Iftikhar Quezada M.D.;  Location: SURGERY [facility];  Service: Orthopedics   • MEDIAL MENISCECTOMY Right 6/4/2021    Procedure: MENISCECTOMY, KNEE, MEDIAL - FOR PARTIAL LATERAL.;  Surgeon: Iftikhar Quezada M.D.;  Location: SURGERY [facility];  Service: Orthopedics       No current facility-administered medications for this encounter.     Current Outpatient Medications   Medication Sig Dispense Refill   • azithromycin (ZITHROMAX) 250 MG Tab Take 4 tablets by mouth once 4 tablet 0   • FATUMA ROOT PO Take  by mouth.         Social History     Socioeconomic History   • Marital status: Single     Spouse name: Not on file   • Number of children: Not on file   • Years of education: Not on file   • Highest education level: Some college, no degree   Occupational History   • Not on file   Tobacco Use   • Smoking status: Never Smoker   • Smokeless tobacco: Never Used   Substance and Sexual Activity   • Alcohol use: Yes     Alcohol/week: 0.6 oz     Types: 1 Cans of beer per week   • Drug use: Yes     Frequency: 7.0 times per week     Types: Marijuana, Inhaled   • Sexual activity: Yes     Partners: Female   Other Topics Concern   • Not on file   Social History Narrative   • Not on file     Social Determinants of Health     Financial Resource Strain: High Risk   • Difficulty of Paying Living Expenses: Hard   Food Insecurity: Food Insecurity Present   • Worried About Running Out of Food in the Last Year: Sometimes true   • Ran Out of Food in the Last Year: Sometimes true    Transportation Needs: No Transportation Needs   • Lack of Transportation (Medical): No   • Lack of Transportation (Non-Medical): No   Physical Activity: Sufficiently Active   • Days of Exercise per Week: 6 days   • Minutes of Exercise per Session: 100 min   Stress: Stress Concern Present   • Feeling of Stress : To some extent   Social Connections: Moderately Isolated   • Frequency of Communication with Friends and Family: More than three times a week   • Frequency of Social Gatherings with Friends and Family: Twice a week   • Attends [employer]ologist Services: 1 to 4 times per year   • Active Member of Clubs or Organizations: No   • Attends Club or Organization Meetings: Patient refused   • Marital Status: Never    Intimate Partner Violence:    • Fear of Current or Ex-Partner:    • Emotionally Abused:    • Physically Abused:    • Sexually Abused:        Family History   Problem Relation Age of Onset   • Psychiatric Illness Mother    • Stroke Maternal Aunt    • Psychiatric Illness Maternal Aunt    • Psychiatric Illness Maternal Uncle    • Cancer Neg Hx    • Diabetes Neg Hx        Allergies  Patient has no known allergies.    Review of Systems  Negative    Physical Exam    Vital Signs  Blood Pressure: 126/71   Temperature: 36.5 °C (97.7 °F)   Pulse: (!) 59   Respiration: 16   Pulse Oximetry: 97 %       Labs:                    Radiology:  No orders to display         Assessment/Plan:  * No Diagnosis Codes entered *  Procedure(s):  ARTHROSCOPY, KNEE  MENISCECTOMY, KNEE, MEDIAL - FOR PARTIAL LATERAL.

## 2021-06-23 ENCOUNTER — PATIENT MESSAGE (OUTPATIENT)
Dept: MEDICAL GROUP | Facility: MEDICAL CENTER | Age: 26
End: 2021-06-23

## 2021-06-23 DIAGNOSIS — N50.812 LEFT TESTICULAR PAIN: ICD-10-CM

## 2021-07-22 ENCOUNTER — OFFICE VISIT (OUTPATIENT)
Dept: MEDICAL GROUP | Facility: MEDICAL CENTER | Age: 26
End: 2021-07-22
Attending: INTERNAL MEDICINE
Payer: MEDICAID

## 2021-07-22 VITALS
HEIGHT: 71 IN | RESPIRATION RATE: 16 BRPM | SYSTOLIC BLOOD PRESSURE: 118 MMHG | HEART RATE: 86 BPM | BODY MASS INDEX: 29.26 KG/M2 | TEMPERATURE: 97.9 F | DIASTOLIC BLOOD PRESSURE: 78 MMHG | WEIGHT: 209 LBS | OXYGEN SATURATION: 99 %

## 2021-07-22 DIAGNOSIS — R10.30 LOWER ABDOMINAL PAIN: ICD-10-CM

## 2021-07-22 DIAGNOSIS — N50.811 PAIN IN BOTH TESTICLES: ICD-10-CM

## 2021-07-22 DIAGNOSIS — N50.812 PAIN IN BOTH TESTICLES: ICD-10-CM

## 2021-07-22 PROCEDURE — 99214 OFFICE O/P EST MOD 30 MIN: CPT | Performed by: INTERNAL MEDICINE

## 2021-07-22 PROCEDURE — 99213 OFFICE O/P EST LOW 20 MIN: CPT | Performed by: INTERNAL MEDICINE

## 2021-07-22 ASSESSMENT — PAIN SCALES - GENERAL: PAINLEVEL: 2=MINIMAL-SLIGHT

## 2021-07-23 NOTE — PROGRESS NOTES
"Subjective:   Raymond Bledsoe is a 25 y.o. male here today for abdominal pain    Lower abdominal pain  He continues to report pain in the left lower abdominal area.  He is tender over the abdominal muscles especially when he engages them doing crunches or other core exercises.  He denies any difficulty with bowel movements, diarrhea, constipation, melena, hematochezia, nausea, vomiting, bloating.  He continues to request an abdominal ultrasound \"to look at my organs\" although we discussed that his pain is consistent with a muscle strain and would not benefit from additional imaging.  He has had a normal inguinal canal ultrasound with no evidence of hernia.      Pain in both testicles  He reports intermittent pain in both of his testicles.  He is a very difficult historian and it is hard to elicit if there are any triggering symptoms.  He does a lot of heavy weightlifting and sometimes he feels pain in his lower abdomen associated with testicular pain when he is doing this.  He also reports that the left testicle raises up and seems to be higher than the right although we discussed that this is normal.  He states that \"my left testicle always feels higher\".  He did have a scrotal ultrasound done recently which was normal but he would like to see urology for a second opinion regarding why he is continuing to have pain.  He was also recently treated for chlamydia but did not notice any change in symptoms after treatment.       Current medicines (including changes today)  Current Outpatient Medications   Medication Sig Dispense Refill   • FATUMA ROOT PO Take  by mouth.       No current facility-administered medications for this visit.     He  has a past medical history of Bradycardia and Depression.    ROS   Denies chest pain, shortness of breath  As above in HPI     Objective:     Vitals:    07/22/21 1703   BP: 118/78   Pulse: 86   Resp: 16   Temp: 36.6 °C (97.9 °F)   SpO2: 99%     Body mass index is 29.16 " kg/m².   Physical Exam:  Constitutional: Alert, no distress.  Skin: Warm, dry, good turgor, no rashes in visible areas.  Eye: Equal, round and reactive, conjunctiva clear, lids normal.  Abdomen: Soft, no significant tenderness to palpation over the left lower quadrant but he does have some discomfort when he palpates the area very deeply and when he engages the abdominal muscles, no masses or hernia, no hepatosplenomegaly.      Assessment and Plan:   The following treatment plan was discussed    1. Lower abdominal pain  Pain is very consistent with musculoskeletal/abdominal wall pain.  We discussed that there is no imaging indicated in this case.  There is no utility in obtaining a ultrasound as the patient requests.  He is somewhat unsatisfied with this.  We discussed that he should hold off on abdominal exercises for at least a few weeks to see if the discomfort improves.    2. Pain in both testicles  With normal imaging of the testicles, unclear etiology.  He has requested a second opinion and I have referred him to urology for this.  - REFERRAL TO UROLOGY        Followup: Return if symptoms worsen or fail to improve.

## 2021-07-23 NOTE — ASSESSMENT & PLAN NOTE
"He reports intermittent pain in both of his testicles.  He is a very difficult historian and it is hard to elicit if there are any triggering symptoms.  He does a lot of heavy weightlifting and sometimes he feels pain in his lower abdomen associated with testicular pain when he is doing this.  He also reports that the left testicle raises up and seems to be higher than the right although we discussed that this is normal.  He states that \"my left testicle always feels higher\".  He did have a scrotal ultrasound done recently which was normal but he would like to see urology for a second opinion regarding why he is continuing to have pain.  He was also recently treated for chlamydia but did not notice any change in symptoms after treatment.  "

## 2021-07-23 NOTE — ASSESSMENT & PLAN NOTE
"He continues to report pain in the left lower abdominal area.  He is tender over the abdominal muscles especially when he engages them doing crunches or other core exercises.  He denies any difficulty with bowel movements, diarrhea, constipation, melena, hematochezia, nausea, vomiting, bloating.  He continues to request an abdominal ultrasound \"to look at my organs\" although we discussed that his pain is consistent with a muscle strain and would not benefit from additional imaging.  He has had a normal inguinal canal ultrasound with no evidence of hernia.    "

## 2021-07-25 ENCOUNTER — OFFICE VISIT (OUTPATIENT)
Dept: URGENT CARE | Facility: CLINIC | Age: 26
End: 2021-07-25
Payer: MEDICAID

## 2021-07-25 VITALS
DIASTOLIC BLOOD PRESSURE: 70 MMHG | OXYGEN SATURATION: 97 % | HEART RATE: 52 BPM | HEIGHT: 72 IN | WEIGHT: 214 LBS | RESPIRATION RATE: 16 BRPM | SYSTOLIC BLOOD PRESSURE: 104 MMHG | TEMPERATURE: 97 F | BODY MASS INDEX: 28.99 KG/M2

## 2021-07-25 DIAGNOSIS — J30.89 ALLERGIC RHINITIS DUE TO OTHER ALLERGIC TRIGGER, UNSPECIFIED SEASONALITY: ICD-10-CM

## 2021-07-25 DIAGNOSIS — J30.81 ALLERGY TO CATS: ICD-10-CM

## 2021-07-25 PROCEDURE — 99203 OFFICE O/P NEW LOW 30 MIN: CPT | Performed by: PHYSICIAN ASSISTANT

## 2021-07-25 RX ORDER — METHYLPREDNISOLONE SODIUM SUCCINATE 125 MG/2ML
125 INJECTION, POWDER, LYOPHILIZED, FOR SOLUTION INTRAMUSCULAR; INTRAVENOUS ONCE
Status: COMPLETED | OUTPATIENT
Start: 2021-07-25 | End: 2021-07-25

## 2021-07-25 RX ORDER — CETIRIZINE HYDROCHLORIDE 10 MG/1
10 TABLET ORAL DAILY
Qty: 90 TABLET | Refills: 3 | Status: SHIPPED | OUTPATIENT
Start: 2021-07-25

## 2021-07-25 RX ADMIN — METHYLPREDNISOLONE SODIUM SUCCINATE 125 MG: 125 INJECTION, POWDER, LYOPHILIZED, FOR SOLUTION INTRAMUSCULAR; INTRAVENOUS at 11:39

## 2021-07-25 ASSESSMENT — ENCOUNTER SYMPTOMS
CHILLS: 0
SORE THROAT: 0
ABDOMINAL PAIN: 0
PHOTOPHOBIA: 0
EYE PAIN: 0
WHEEZING: 0
COUGH: 0
SHORTNESS OF BREATH: 0
DIARRHEA: 0
EYE REDNESS: 1
FEVER: 0
SPUTUM PRODUCTION: 0
NAUSEA: 0
DOUBLE VISION: 0
BLURRED VISION: 0
VOMITING: 0
EYE DISCHARGE: 1
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
## 2021-12-07 ENCOUNTER — OFFICE VISIT (OUTPATIENT)
Dept: URGENT CARE | Facility: CLINIC | Age: 26
End: 2021-12-07
Payer: MEDICAID

## 2021-12-07 VITALS
RESPIRATION RATE: 14 BRPM | HEART RATE: 62 BPM | WEIGHT: 221 LBS | TEMPERATURE: 97.3 F | BODY MASS INDEX: 30.94 KG/M2 | DIASTOLIC BLOOD PRESSURE: 74 MMHG | OXYGEN SATURATION: 97 % | HEIGHT: 71 IN | SYSTOLIC BLOOD PRESSURE: 110 MMHG

## 2021-12-07 DIAGNOSIS — S86.912A STRAIN OF LEFT KNEE, INITIAL ENCOUNTER: ICD-10-CM

## 2021-12-07 PROCEDURE — 99213 OFFICE O/P EST LOW 20 MIN: CPT | Performed by: FAMILY MEDICINE

## 2021-12-07 ASSESSMENT — ENCOUNTER SYMPTOMS
FEVER: 0
SORE THROAT: 0
COUGH: 0
VOMITING: 0

## 2021-12-07 NOTE — PROGRESS NOTES
Subjective:     Raymond Bledsoe is a 25 y.o. male who presents for Knee Injury ((L) x 2 days.  Pt. was coming down stairs and thinks his L knee buckled under him.  He is having swelling and felt a burning sensation. )    HPI  Pt presents for evaluation of an acute problem  Patient with left-sided knee pain which started after an acute injury 2 days ago  He was walking down the stairs and knee gave out on him  Slipped and fell backwards   Does not remember hitting his knee on anything   Knee is painful with ambulation  Knee does not feel unstable    Review of Systems   Constitutional: Negative for fever.   HENT: Negative for sore throat.    Respiratory: Negative for cough.    Gastrointestinal: Negative for vomiting.   Skin: Negative for rash.       PMH:  has a past medical history of Bradycardia and Depression.  MEDS:   Current Outpatient Medications:   •  FATUMA ROOT PO, Take  by mouth., Disp: , Rfl:   •  cetirizine (ZYRTEC ALLERGY) 10 MG Tab, Take 1 tablet by mouth every day. (Patient not taking: Reported on 12/7/2021), Disp: 90 tablet, Rfl: 3  ALLERGIES:   Allergies   Allergen Reactions   • Cat Hair Extract Itching     Throat irritation, itchy skin, etc.     SURGHX:   Past Surgical History:   Procedure Laterality Date   • PB KNEE SCOPE,DIAGNOSTIC Right 6/4/2021    Procedure: ARTHROSCOPY, KNEE;  Surgeon: Iftikhar Quezada M.D.;  Location: SURGERY [facility];  Service: Orthopedics   • MEDIAL MENISCECTOMY Right 6/4/2021    Procedure: MENISCECTOMY, KNEE, MEDIAL - FOR PARTIAL LATERAL.;  Surgeon: Iftikhar Quezada M.D.;  Location: SURGERY [facility];  Service: Orthopedics     SOCHX:  reports that he has never smoked. He has never used smokeless tobacco. He reports current alcohol use of about 0.6 oz of alcohol per week. He reports current drug use. Frequency: 7.00 times per week. Drugs: Marijuana and Inhaled.     Objective:   /74   Pulse 62   Temp 36.3 °C (97.3 °F) (Temporal)   Resp 14   Ht 1.803 m (5'  "11\")   Wt 100 kg (221 lb)   SpO2 97%   BMI 30.82 kg/m²     Physical Exam  Constitutional:       General: He is not in acute distress.     Appearance: He is well-developed. He is not diaphoretic.   Pulmonary:      Effort: Pulmonary effort is normal.   Neurological:      Mental Status: He is alert.     Left knee  Appearance - +Mild swelling throughout knee  Palpation - +Mild TTP along lateral knee at distal IT band, LCL, and lateral joint line.  No tenderness to palpation along medial joint line, patellar tendon, hamstring tendons, or quads.  No palpable effusion.  ROM - FROM without crepitus  Strength - 5/5 throughout  Neuro - Sensation equal and intact bilaterally  Special testing - No laxity with varus/valgus stress, neg anterior drawer, neg posterior drawer, neg Lachman's, neg Catarina's, neg patellar apprehension test    Assessment/Plan:   Assessment    1. Strain of left knee, initial encounter    Patient with strain of left knee.  Has no ligament laxity or sign of internal derangement.  Given a hinged knee brace and recommended follow-up with his orthopedist in the next 2 to 3 weeks if not greatly improving.  Patient agreeable to plan and will follow up with orthopedics if pain not greatly improving.    "

## 2021-12-07 NOTE — LETTER
December 7, 2021         Patient: Raymond Bledsoe   YOB: 1995   Date of Visit: 12/7/2021           To Whom it May Concern:    Raymond Bledsoe was seen in my clinic on 12/7/2021. He may return to work on 12/8/2021.    If you have any questions or concerns, please don't hesitate to call.        Sincerely,           Surendra Cota M.D.  Electronically Signed

## 2022-01-03 PROBLEM — S83.281A ACUTE LATERAL MENISCUS TEAR OF RIGHT KNEE: Status: ACTIVE | Noted: 2022-01-03

## 2022-01-18 ENCOUNTER — HOSPITAL ENCOUNTER (OUTPATIENT)
Facility: MEDICAL CENTER | Age: 27
End: 2022-01-18
Attending: NURSE PRACTITIONER
Payer: OTHER MISCELLANEOUS

## 2022-01-18 ENCOUNTER — OFFICE VISIT (OUTPATIENT)
Dept: URGENT CARE | Facility: CLINIC | Age: 27
End: 2022-01-18
Payer: OTHER MISCELLANEOUS

## 2022-01-18 VITALS
SYSTOLIC BLOOD PRESSURE: 120 MMHG | BODY MASS INDEX: 30.8 KG/M2 | DIASTOLIC BLOOD PRESSURE: 80 MMHG | HEIGHT: 71 IN | OXYGEN SATURATION: 99 % | WEIGHT: 220 LBS | HEART RATE: 77 BPM | RESPIRATION RATE: 16 BRPM | TEMPERATURE: 99.2 F

## 2022-01-18 DIAGNOSIS — Z20.822 SUSPECTED COVID-19 VIRUS INFECTION: ICD-10-CM

## 2022-01-18 PROCEDURE — 99213 OFFICE O/P EST LOW 20 MIN: CPT | Mod: CS | Performed by: NURSE PRACTITIONER

## 2022-01-18 PROCEDURE — U0003 INFECTIOUS AGENT DETECTION BY NUCLEIC ACID (DNA OR RNA); SEVERE ACUTE RESPIRATORY SYNDROME CORONAVIRUS 2 (SARS-COV-2) (CORONAVIRUS DISEASE [COVID-19]), AMPLIFIED PROBE TECHNIQUE, MAKING USE OF HIGH THROUGHPUT TECHNOLOGIES AS DESCRIBED BY CMS-2020-01-R: HCPCS

## 2022-01-18 ASSESSMENT — ENCOUNTER SYMPTOMS
MYALGIAS: 1
SPUTUM PRODUCTION: 1
HEADACHES: 1
DIARRHEA: 0
FATIGUE: 1
WHEEZING: 0
SHORTNESS OF BREATH: 1
HEMOPTYSIS: 0
CHILLS: 1
COUGH: 1
VOMITING: 0
SORE THROAT: 1
FEVER: 0
ABDOMINAL PAIN: 0
NAUSEA: 0
CHANGE IN BOWEL HABIT: 0

## 2022-01-19 DIAGNOSIS — Z20.822 SUSPECTED COVID-19 VIRUS INFECTION: ICD-10-CM

## 2022-01-19 LAB — COVID ORDER STATUS COVID19: NORMAL

## 2022-01-19 NOTE — PROGRESS NOTES
Subjective:     Raymond Bledsoe is a 26 y.o. male who presents for Coronavirus Screening (Chills, body aches, headache, coughing up phlem, 2 days)      Influenza  This is a new problem. The current episode started in the past 7 days (Raymond is a pleasant 26 year old male who presents to  today with compliants of flu/COVID symptoms). The problem occurs constantly. The problem has been unchanged. Associated symptoms include chills, congestion, coughing, fatigue, headaches, myalgias and a sore throat. Pertinent negatives include no abdominal pain, change in bowel habit, fever, nausea or vomiting. He has tried rest (Hot baths. OTC headache relief) for the symptoms. The treatment provided mild relief.         Review of Systems   Constitutional: Positive for chills, fatigue and malaise/fatigue. Negative for fever.   HENT: Positive for congestion and sore throat.    Respiratory: Positive for cough, sputum production and shortness of breath. Negative for hemoptysis and wheezing.    Gastrointestinal: Negative for abdominal pain, change in bowel habit, diarrhea, nausea and vomiting.   Musculoskeletal: Positive for myalgias.   Neurological: Positive for headaches.       PMH:   Past Medical History:   Diagnosis Date   • Bradycardia     50's sometimes 51   • Depression      ALLERGIES:   Allergies   Allergen Reactions   • Cat Hair Extract Itching     Throat irritation, itchy skin, etc.     SURGHX:   Past Surgical History:   Procedure Laterality Date   • PB KNEE SCOPE,DIAGNOSTIC Right 6/4/2021    Procedure: ARTHROSCOPY, KNEE;  Surgeon: Iftikhar Quezada M.D.;  Location: SURGERY [facility];  Service: Orthopedics   • MENISCECTOMY, KNEE, MEDIAL Right 6/4/2021    Procedure: MENISCECTOMY, KNEE, MEDIAL - FOR PARTIAL LATERAL.;  Surgeon: Iftikhar Quezada M.D.;  Location: [facility];  Service: Orthopedics     SOCHX:   Social History     Socioeconomic History   • Marital status: Single     Spouse name: Not on file   • Number  of children: Not on file   • Years of education: Not on file   • Highest education level: Some college, no degree   Occupational History   • Not on file   Tobacco Use   • Smoking status: Never Smoker   • Smokeless tobacco: Never Used   Vaping Use   • Vaping Use: Never used   Substance and Sexual Activity   • Alcohol use: Yes     Alcohol/week: 0.6 oz     Types: 1 Cans of beer per week   • Drug use: Yes     Frequency: 7.0 times per week     Types: Marijuana, Inhaled   • Sexual activity: Yes     Partners: Female   Other Topics Concern   • Not on file   Social History Narrative   • Not on file     Social Determinants of Health     Financial Resource Strain: High Risk   • Difficulty of Paying Living Expenses: Hard   Food Insecurity: Food Insecurity Present   • Worried About Running Out of Food in the Last Year: Sometimes true   • Ran Out of Food in the Last Year: Sometimes true   Transportation Needs: No Transportation Needs   • Lack of Transportation (Medical): No   • Lack of Transportation (Non-Medical): No   Physical Activity: Sufficiently Active   • Days of Exercise per Week: 6 days   • Minutes of Exercise per Session: 100 min   Stress: Stress Concern Present   • Feeling of Stress : To some extent   Social Connections: Moderately Isolated   • Frequency of Communication with Friends and Family: More than three times a week   • Frequency of Social Gatherings with Friends and Family: Twice a week   • Attends [employer] Services: 1 to 4 times per year   • Active Member of Clubs or Organizations: No   • Attends Club or Organization Meetings: Patient refused   • Marital Status: Never    Intimate Partner Violence:    • Fear of Current or Ex-Partner: Not on file   • Emotionally Abused: Not on file   • Physically Abused: Not on file   • Sexually Abused: Not on file   Housing Stability: High Risk   • Unable to Pay for Housing in the Last Year: Yes   • Number of Places Lived in the Last Year: 3   • Unstable Housing in the  "Last Year: No     FH:   Family History   Problem Relation Age of Onset   • Psychiatric Illness Mother    • Stroke Maternal Aunt    • Psychiatric Illness Maternal Aunt    • Psychiatric Illness Maternal Uncle    • Cancer Neg Hx    • Diabetes Neg Hx          Objective:   /80   Pulse 77   Temp 37.3 °C (99.2 °F) (Temporal)   Resp 16   Ht 1.803 m (5' 11\")   Wt 99.8 kg (220 lb)   SpO2 99%   BMI 30.68 kg/m²     Physical Exam  Vitals and nursing note reviewed.   Constitutional:       General: He is not in acute distress.     Appearance: Normal appearance. He is normal weight. He is ill-appearing. He is not toxic-appearing.   HENT:      Head: Normocephalic and atraumatic.      Right Ear: Tympanic membrane, ear canal and external ear normal. There is no impacted cerumen.      Left Ear: Tympanic membrane, ear canal and external ear normal. There is no impacted cerumen.      Nose: Congestion and rhinorrhea present.      Mouth/Throat:      Mouth: Mucous membranes are moist.      Pharynx: Posterior oropharyngeal erythema present. No oropharyngeal exudate.   Eyes:      Extraocular Movements: Extraocular movements intact.      Pupils: Pupils are equal, round, and reactive to light.   Cardiovascular:      Rate and Rhythm: Normal rate and regular rhythm.      Pulses: Normal pulses.      Heart sounds: Normal heart sounds. No murmur heard.      Pulmonary:      Effort: Pulmonary effort is normal. No respiratory distress.      Breath sounds: Normal breath sounds. No stridor. No wheezing, rhonchi or rales.   Chest:      Chest wall: No tenderness.   Abdominal:      General: Abdomen is flat. Bowel sounds are normal.      Palpations: Abdomen is soft.      Tenderness: There is no abdominal tenderness. There is no right CVA tenderness or left CVA tenderness.   Musculoskeletal:         General: Normal range of motion.      Cervical back: Normal range of motion and neck supple. No tenderness.   Lymphadenopathy:      Cervical: No " cervical adenopathy.   Skin:     General: Skin is warm and dry.      Capillary Refill: Capillary refill takes less than 2 seconds.   Neurological:      General: No focal deficit present.      Mental Status: He is alert and oriented to person, place, and time. Mental status is at baseline.   Psychiatric:         Mood and Affect: Mood normal.         Behavior: Behavior normal.         Thought Content: Thought content normal.         Judgment: Judgment normal.         Assessment/Plan:   Assessment    1. Suspected COVID-19 virus infection  SARS-CoV-2 PCR (24 hour In-House): Collect NP swab in VTM     Pt was tested for COVID today. I will call with results. Upon entering the room PPE was worn throughout the duration of his visit for both provider and patient. Mask of patient briefly removed for examination of oropharynx. PT was educated to remain in self isolation for at least 10 days from onset of symptoms followed by an additional 24-hour period of being symptom-free without the use of symptom altering medication.  We discussed supportive measures including humidifier, warm salt water gargles, over-the-counter Cepacol throat lozenges, rest  and increased fluids. Pt was encouraged to seek treatment back in the ER or urgent care for worsening symptoms,  fever greater than 100.5, wheezes or shortness of breath.    AVS handout given and reviewed with patient. Pt educated on red flags and when to seek treatment back in ER or UC.

## 2022-01-21 LAB
SARS-COV-2 RNA RESP QL NAA+PROBE: DETECTED
SPECIMEN SOURCE: ABNORMAL

## 2022-01-24 ENCOUNTER — PRE-ADMISSION TESTING (OUTPATIENT)
Dept: ADMISSIONS | Facility: MEDICAL CENTER | Age: 27
End: 2022-01-24
Attending: ORTHOPAEDIC SURGERY
Payer: COMMERCIAL

## 2022-01-26 DIAGNOSIS — S83.282A ACUTE LATERAL MENISCUS TEAR OF LEFT KNEE, INITIAL ENCOUNTER: ICD-10-CM

## 2022-02-17 ENCOUNTER — NON-PROVIDER VISIT (OUTPATIENT)
Dept: OCCUPATIONAL MEDICINE | Facility: CLINIC | Age: 27
End: 2022-02-17

## 2022-02-17 DIAGNOSIS — Z02.1 PRE-EMPLOYMENT HEALTH SCREENING EXAMINATION: ICD-10-CM

## 2022-02-17 DIAGNOSIS — Z02.1 PRE-EMPLOYMENT DRUG SCREENING: ICD-10-CM

## 2022-02-17 LAB
AMP AMPHETAMINE: NORMAL
COC COCAINE: NORMAL
INT CON NEG: NORMAL
INT CON POS: NORMAL
MET METHAMPHETAMINES: NORMAL
OPI OPIATES: NORMAL
PCP PHENCYCLIDINE: NORMAL
POC DRUG COMMENT 753798-OCCUPATIONAL HEALTH: NEGATIVE
THC: NORMAL

## 2022-02-17 PROCEDURE — 80305 DRUG TEST PRSMV DIR OPT OBS: CPT | Performed by: PREVENTIVE MEDICINE

## 2022-03-14 ENCOUNTER — PRE-ADMISSION TESTING (OUTPATIENT)
Dept: ADMISSIONS | Facility: MEDICAL CENTER | Age: 27
End: 2022-03-14
Attending: ORTHOPAEDIC SURGERY
Payer: COMMERCIAL

## 2022-03-28 ENCOUNTER — APPOINTMENT (OUTPATIENT)
Dept: ADMISSIONS | Facility: MEDICAL CENTER | Age: 27
End: 2022-03-28
Attending: ORTHOPAEDIC SURGERY
Payer: COMMERCIAL

## 2022-03-28 ENCOUNTER — HOSPITAL ENCOUNTER (EMERGENCY)
Facility: MEDICAL CENTER | Age: 27
End: 2022-03-28
Attending: EMERGENCY MEDICINE
Payer: COMMERCIAL

## 2022-03-28 VITALS
BODY MASS INDEX: 29.48 KG/M2 | SYSTOLIC BLOOD PRESSURE: 116 MMHG | HEART RATE: 70 BPM | WEIGHT: 210.54 LBS | HEIGHT: 71 IN | RESPIRATION RATE: 16 BRPM | TEMPERATURE: 98.2 F | OXYGEN SATURATION: 100 % | DIASTOLIC BLOOD PRESSURE: 61 MMHG

## 2022-03-28 DIAGNOSIS — K52.9 GASTROENTERITIS: ICD-10-CM

## 2022-03-28 LAB
ALBUMIN SERPL BCP-MCNC: 4.3 G/DL (ref 3.2–4.9)
ALBUMIN/GLOB SERPL: 1.7 G/DL
ALP SERPL-CCNC: 76 U/L (ref 30–99)
ALT SERPL-CCNC: 26 U/L (ref 2–50)
ANION GAP SERPL CALC-SCNC: 11 MMOL/L (ref 7–16)
AST SERPL-CCNC: 22 U/L (ref 12–45)
BASOPHILS # BLD AUTO: 0.4 % (ref 0–1.8)
BASOPHILS # BLD: 0.03 K/UL (ref 0–0.12)
BILIRUB SERPL-MCNC: 1.4 MG/DL (ref 0.1–1.5)
BUN SERPL-MCNC: 10 MG/DL (ref 8–22)
CALCIUM SERPL-MCNC: 9.3 MG/DL (ref 8.4–10.2)
CHLORIDE SERPL-SCNC: 101 MMOL/L (ref 96–112)
CO2 SERPL-SCNC: 25 MMOL/L (ref 20–33)
CREAT SERPL-MCNC: 1.13 MG/DL (ref 0.5–1.4)
EOSINOPHIL # BLD AUTO: 0.02 K/UL (ref 0–0.51)
EOSINOPHIL NFR BLD: 0.3 % (ref 0–6.9)
ERYTHROCYTE [DISTWIDTH] IN BLOOD BY AUTOMATED COUNT: 41.8 FL (ref 35.9–50)
GFR SERPLBLD CREATININE-BSD FMLA CKD-EPI: 93 ML/MIN/1.73 M 2
GLOBULIN SER CALC-MCNC: 2.6 G/DL (ref 1.9–3.5)
GLUCOSE SERPL-MCNC: 96 MG/DL (ref 65–99)
HCT VFR BLD AUTO: 46.7 % (ref 42–52)
HGB BLD-MCNC: 16.2 G/DL (ref 14–18)
IMM GRANULOCYTES # BLD AUTO: 0.03 K/UL (ref 0–0.11)
IMM GRANULOCYTES NFR BLD AUTO: 0.4 % (ref 0–0.9)
LIPASE SERPL-CCNC: 17 U/L (ref 7–58)
LYMPHOCYTES # BLD AUTO: 1.03 K/UL (ref 1–4.8)
LYMPHOCYTES NFR BLD: 13.5 % (ref 22–41)
MCH RBC QN AUTO: 30.7 PG (ref 27–33)
MCHC RBC AUTO-ENTMCNC: 34.7 G/DL (ref 33.7–35.3)
MCV RBC AUTO: 88.4 FL (ref 81.4–97.8)
MONOCYTES # BLD AUTO: 0.5 K/UL (ref 0–0.85)
MONOCYTES NFR BLD AUTO: 6.6 % (ref 0–13.4)
NEUTROPHILS # BLD AUTO: 6.01 K/UL (ref 1.82–7.42)
NEUTROPHILS NFR BLD: 78.8 % (ref 44–72)
NRBC # BLD AUTO: 0 K/UL
NRBC BLD-RTO: 0 /100 WBC
PLATELET # BLD AUTO: 136 K/UL (ref 164–446)
PMV BLD AUTO: 10.1 FL (ref 9–12.9)
POTASSIUM SERPL-SCNC: 3.7 MMOL/L (ref 3.6–5.5)
PROT SERPL-MCNC: 6.9 G/DL (ref 6–8.2)
RBC # BLD AUTO: 5.28 M/UL (ref 4.7–6.1)
SODIUM SERPL-SCNC: 137 MMOL/L (ref 135–145)
WBC # BLD AUTO: 7.6 K/UL (ref 4.8–10.8)

## 2022-03-28 PROCEDURE — 83690 ASSAY OF LIPASE: CPT

## 2022-03-28 PROCEDURE — 700111 HCHG RX REV CODE 636 W/ 250 OVERRIDE (IP): Performed by: EMERGENCY MEDICINE

## 2022-03-28 PROCEDURE — 99284 EMERGENCY DEPT VISIT MOD MDM: CPT

## 2022-03-28 PROCEDURE — 80053 COMPREHEN METABOLIC PANEL: CPT

## 2022-03-28 PROCEDURE — 700105 HCHG RX REV CODE 258: Performed by: EMERGENCY MEDICINE

## 2022-03-28 PROCEDURE — 85025 COMPLETE CBC W/AUTO DIFF WBC: CPT

## 2022-03-28 PROCEDURE — 96374 THER/PROPH/DIAG INJ IV PUSH: CPT

## 2022-03-28 PROCEDURE — 36415 COLL VENOUS BLD VENIPUNCTURE: CPT

## 2022-03-28 RX ORDER — ONDANSETRON 4 MG/1
4 TABLET, ORALLY DISINTEGRATING ORAL EVERY 6 HOURS PRN
Qty: 10 TABLET | Refills: 0 | Status: SHIPPED | OUTPATIENT
Start: 2022-03-28

## 2022-03-28 RX ORDER — ONDANSETRON 2 MG/ML
4 INJECTION INTRAMUSCULAR; INTRAVENOUS ONCE
Status: COMPLETED | OUTPATIENT
Start: 2022-03-28 | End: 2022-03-28

## 2022-03-28 RX ORDER — SODIUM CHLORIDE, SODIUM LACTATE, POTASSIUM CHLORIDE, CALCIUM CHLORIDE 600; 310; 30; 20 MG/100ML; MG/100ML; MG/100ML; MG/100ML
1000 INJECTION, SOLUTION INTRAVENOUS ONCE
Status: COMPLETED | OUTPATIENT
Start: 2022-03-28 | End: 2022-03-28

## 2022-03-28 RX ADMIN — ONDANSETRON 4 MG: 2 INJECTION INTRAMUSCULAR; INTRAVENOUS at 14:45

## 2022-03-28 RX ADMIN — SODIUM CHLORIDE, POTASSIUM CHLORIDE, SODIUM LACTATE AND CALCIUM CHLORIDE 1000 ML: 600; 310; 30; 20 INJECTION, SOLUTION INTRAVENOUS at 14:45

## 2022-03-28 NOTE — ED NOTES
PIV established, blood work sent to lab.   Due meds and IV fluids given.   Pt c/o 5/10 headache, abd pain no longer present.

## 2022-03-28 NOTE — ED NOTES
Assisted w/ discharge.  Reviewed discharge instructions and prescription x 1 sent to selected Pharmacy w/ pt, verbalized understanding to information provided including follow up care, return precautions and medication.  Discussed diet changes, denied further questions/concerns.  Pt provided w/ one day printed work release note.  Pt ambulated from ED w/ visitor.

## 2022-03-28 NOTE — ED PROVIDER NOTES
ED Provider Note    CHIEF COMPLAINT  Chief Complaint   Patient presents with   • Nausea     Since yesterday No emesis   • Headache   • Fever     Felt hot and cold but no documented fever   • Diarrhea     BM > 10/24hrs  liquid stool Denies blood  NOT vaccinated for covid  Denies cough or sore throat       HPI  Raymond Mario Bledsoe is a 26 y.o. male who presents for evaluation of abrupt onset low-grade fever chills nausea vomiting and some nonbloody stools.  The patient reports that he thinks he ate some bad food.  Apparently he had a hamburger into milkshakes at [employer] yesterday.  Other family members had hamburgers as well with no milkshakes and had no symptoms.  He specifically denies any high fever productive cough sore throat.  He did report that his urine was dark this morning and he was concerned about severe dehydration.  He reports crampy intermittent abdominal pain.    REVIEW OF SYSTEMS  See HPI for further details.  No night sweats weight loss numbness tingling weakness rash all other systems are negative.     PAST MEDICAL HISTORY  Past Medical History:   Diagnosis Date   • COVID-19 01/15/2022   • Bradycardia     50's sometimes 51   • Depression    • Infectious disease     Covid exposure 1/18.  Stomach pain, chills, HA, cough.  Continues to have congestion.  [facility] 1/[facility]       FAMILY HISTORY  Noncontributory    SOCIAL HISTORY  Social History     Socioeconomic History   • Marital status: Single   • Highest education level: Some college, no degree   Tobacco Use   • Smoking status: Never Smoker   • Smokeless tobacco: Never Used   Vaping Use   • Vaping Use: Never used   Substance and Sexual Activity   • Alcohol use: Yes     Alcohol/week: 0.6 oz     Types: 1 Cans of beer per week   • Drug use: Not Currently     Frequency: 7.0 times per week     Types: Marijuana, Inhaled, Oral     Comment: 3-4 times per week   • Sexual activity: Yes     Partners: Female     Social Determinants  of Health     Financial Resource Strain: High Risk   • Difficulty of Paying Living Expenses: Hard   Food Insecurity: Food Insecurity Present   • Worried About Running Out of Food in the Last Year: Sometimes true   • Ran Out of Food in the Last Year: Sometimes true   Transportation Needs: No Transportation Needs   • Lack of Transportation (Medical): No   • Lack of Transportation (Non-Medical): No   Physical Activity: Sufficiently Active   • Days of Exercise per Week: 6 days   • Minutes of Exercise per Session: 100 min   Stress: Stress Concern Present   • Feeling of Stress : To some extent   Social Connections: Moderately Isolated   • Frequency of Communication with Friends and Family: More than three times a week   • Frequency of Social Gatherings with Friends and Family: Twice a week   • Attends Ep[employer]op[employer] Services: 1 to 4 times per year   • Active Member of Clubs or Organizations: No   • Attends Club or Organization Meetings: Patient refused   • Marital Status: Never    Housing Stability: High Risk   • Unable to Pay for Housing in the Last Year: Yes   • Number of Places Lived in the Last Year: 3   • Unstable Housing in the Last Year: No     No drug or alcohol abuse  SURGICAL HISTORY  Past Surgical History:   Procedure Laterality Date   • PB KNEE SCOPE,DIAGNOSTIC Right 6/4/2021    Procedure: ARTHROSCOPY, KNEE;  Surgeon: Iftikhar Quezada M.D.;  Location: SURGERY [facility];  Service: Orthopedics   • MENISCECTOMY, KNEE, MEDIAL Right 6/4/2021    Procedure: MENISCECTOMY, KNEE, MEDIAL - FOR PARTIAL LATERAL.;  Surgeon: Iftikhar Quezada M.D.;  Location: SURGERY [facility];  Service: Orthopedics       CURRENT MEDICATIONS  Home Medications    **Home medications have not yet been reviewed for this encounter**       No regular meds  ALLERGIES  Allergies   Allergen Reactions   • Cat Hair Extract Itching     Throat irritation, itchy skin, etc.       PHYSICAL EXAM  VITAL SIGNS: /75   Pulse 85   Temp 36.8 °C (98.2 °F)  "(Oral)   Resp 14   Ht 1.803 m (5' 11\")   Wt 95.5 kg (210 lb 8.6 oz)   SpO2 96%   BMI 29.36 kg/m²       Constitutional: Well developed, Well nourished, No acute distress, Non-toxic appearance.   HENT: Normocephalic, Atraumatic, Bilateral external ears normal, dry mucous membranes, No oral exudates, Nose normal.   Eyes: PERRLA, EOMI, Conjunctiva normal, No discharge.   Neck: Normal range of motion, No tenderness, Supple, No stridor.   Cardiovascular: Normal heart rate, Normal rhythm, No murmurs, No rubs, No gallops.   Thorax & Lungs: Normal breath sounds, No respiratory distress, No wheezing, No chest tenderness.   Abdomen: Bowel sounds hyperactive no focal rebound guarding or rigidity soft, No tenderness, No masses, No pulsatile masses.   Skin: Warm, Dry, No erythema, No rash.   Back: No tenderness, No CVA tenderness.   Extremities: Intact distal pulses, No edema, No tenderness, No cyanosis, No clubbing.   Neurologic: Alert & oriented x 3, Normal motor function, Normal sensory function, No focal deficits noted.   Psychiatric anxious.       COURSE & MEDICAL DECISION MAKING  Pertinent Labs & Imaging studies reviewed. (See chart for details)  Results for orders placed or performed during the hospital encounter of 03/28/22   CBC WITH DIFFERENTIAL   Result Value Ref Range    WBC 7.6 4.8 - 10.8 K/uL    RBC 5.28 4.70 - 6.10 M/uL    Hemoglobin 16.2 14.0 - 18.0 g/dL    Hematocrit 46.7 42.0 - 52.0 %    MCV 88.4 81.4 - 97.8 fL    MCH 30.7 27.0 - 33.0 pg    MCHC 34.7 33.7 - 35.3 g/dL    RDW 41.8 35.9 - 50.0 fL    Platelet Count 136 (L) 164 - 446 K/uL    MPV 10.1 9.0 - 12.9 fL    Neutrophils-Polys 78.80 (H) 44.00 - 72.00 %    Lymphocytes 13.50 (L) 22.00 - 41.00 %    Monocytes 6.60 0.00 - 13.40 %    Eosinophils 0.30 0.00 - 6.90 %    Basophils 0.40 0.00 - 1.80 %    Immature Granulocytes 0.40 0.00 - 0.90 %    Nucleated RBC 0.00 /100 WBC    Neutrophils (Absolute) 6.01 1.82 - 7.42 K/uL    Lymphs (Absolute) 1.03 1.00 - 4.80 K/uL "    Monos (Absolute) 0.50 0.00 - 0.85 K/uL    Eos (Absolute) 0.02 0.00 - 0.51 K/uL    Baso (Absolute) 0.03 0.00 - 0.12 K/uL    Immature Granulocytes (abs) 0.03 0.00 - 0.11 K/uL    NRBC (Absolute) 0.00 K/uL   Comp Metabolic Panel   Result Value Ref Range    Sodium 137 135 - 145 mmol/L    Potassium 3.7 3.6 - 5.5 mmol/L    Chloride 101 96 - 112 mmol/L    Co2 25 20 - 33 mmol/L    Anion Gap 11.0 7.0 - 16.0    Glucose 96 65 - 99 mg/dL    Bun 10 8 - 22 mg/dL    Creatinine 1.13 0.50 - 1.40 mg/dL    Calcium 9.3 8.4 - 10.2 mg/dL    AST(SGOT) 22 12 - 45 U/L    ALT(SGPT) 26 2 - 50 U/L    Alkaline Phosphatase 76 30 - 99 U/L    Total Bilirubin 1.4 0.1 - 1.5 mg/dL    Albumin 4.3 3.2 - 4.9 g/dL    Total Protein 6.9 6.0 - 8.2 g/dL    Globulin 2.6 1.9 - 3.5 g/dL    A-G Ratio 1.7 g/dL   LIPASE   Result Value Ref Range    Lipase 17 7 - 58 U/L   ESTIMATED GFR   Result Value Ref Range    GFR (CKD-EPI) 93 >60 mL/min/1.73 m 2       View was established.  The patient presents here with some low-grade fever chills and nausea vomiting and nonbloody diarrhea.  Clinical exam is reassuring.  He has no peritoneal signs.  CBC does not demonstrate any leukocytosis or bandemia.  Metabolic panel does not demonstrate any significant electrolyte abnormalities or metabolic acidosis.  Lipase and transaminase levels are normal.  Patient could either have preformed toxin classic food poisoning and/or viral enteritis.  I counseled the patient that antibiotics are of no use.  After treatment with antiemetics and IV fluids he feels much better.  The patient will be discharged home on sublingual Zofran and clear liquid diet for the next 12 to 24 hours.  Return precautions have been reviewed  FINAL IMPRESSION  1.  Gastroenteritis         Electronically signed by: Jose Manuel Rivera M.D., 3/28/2022 2:32 PM

## 2022-03-28 NOTE — ED TRIAGE NOTES
NAD Pt feels he is dehydrated Reports very dark urine and generalized weakness  Pt feels it may be food poisoning from [employer]

## 2022-03-31 ENCOUNTER — HOSPITAL ENCOUNTER (OUTPATIENT)
Facility: MEDICAL CENTER | Age: 27
End: 2022-03-31
Attending: ORTHOPAEDIC SURGERY | Admitting: ORTHOPAEDIC SURGERY
Payer: COMMERCIAL

## 2022-03-31 ENCOUNTER — ANESTHESIA EVENT (OUTPATIENT)
Dept: SURGERY | Facility: MEDICAL CENTER | Age: 27
End: 2022-03-31
Payer: COMMERCIAL

## 2022-03-31 ENCOUNTER — ANESTHESIA (OUTPATIENT)
Dept: SURGERY | Facility: MEDICAL CENTER | Age: 27
End: 2022-03-31
Payer: COMMERCIAL

## 2022-03-31 VITALS
TEMPERATURE: 97.2 F | WEIGHT: 207.89 LBS | SYSTOLIC BLOOD PRESSURE: 107 MMHG | OXYGEN SATURATION: 96 % | HEART RATE: 61 BPM | HEIGHT: 71 IN | BODY MASS INDEX: 29.1 KG/M2 | RESPIRATION RATE: 16 BRPM | DIASTOLIC BLOOD PRESSURE: 65 MMHG

## 2022-03-31 DIAGNOSIS — S83.281A ACUTE LATERAL MENISCUS TEAR OF RIGHT KNEE, INITIAL ENCOUNTER: ICD-10-CM

## 2022-03-31 DIAGNOSIS — S83.282A ACUTE LATERAL MENISCUS TEAR OF LEFT KNEE, INITIAL ENCOUNTER: ICD-10-CM

## 2022-03-31 DIAGNOSIS — G89.18 POSTOPERATIVE PAIN: ICD-10-CM

## 2022-03-31 PROCEDURE — 700101 HCHG RX REV CODE 250: Performed by: ORTHOPAEDIC SURGERY

## 2022-03-31 PROCEDURE — 160046 HCHG PACU - 1ST 60 MINS PHASE II: Performed by: ORTHOPAEDIC SURGERY

## 2022-03-31 PROCEDURE — 160009 HCHG ANES TIME/MIN: Performed by: ORTHOPAEDIC SURGERY

## 2022-03-31 PROCEDURE — 700105 HCHG RX REV CODE 258: Performed by: ORTHOPAEDIC SURGERY

## 2022-03-31 PROCEDURE — 700111 HCHG RX REV CODE 636 W/ 250 OVERRIDE (IP): Performed by: ANESTHESIOLOGY

## 2022-03-31 PROCEDURE — 160048 HCHG OR STATISTICAL LEVEL 1-5: Performed by: ORTHOPAEDIC SURGERY

## 2022-03-31 PROCEDURE — 700101 HCHG RX REV CODE 250: Performed by: ANESTHESIOLOGY

## 2022-03-31 PROCEDURE — 160029 HCHG SURGERY MINUTES - 1ST 30 MINS LEVEL 4: Performed by: ORTHOPAEDIC SURGERY

## 2022-03-31 PROCEDURE — 501838 HCHG SUTURE GENERAL: Performed by: ORTHOPAEDIC SURGERY

## 2022-03-31 PROCEDURE — 160002 HCHG RECOVERY MINUTES (STAT): Performed by: ORTHOPAEDIC SURGERY

## 2022-03-31 PROCEDURE — 29881 ARTHRS KNE SRG MNISECTMY M/L: CPT | Mod: LT | Performed by: ORTHOPAEDIC SURGERY

## 2022-03-31 PROCEDURE — A9270 NON-COVERED ITEM OR SERVICE: HCPCS | Performed by: ANESTHESIOLOGY

## 2022-03-31 PROCEDURE — 29881 ARTHRS KNE SRG MNISECTMY M/L: CPT | Mod: ASROC,LT | Performed by: PHYSICIAN ASSISTANT

## 2022-03-31 PROCEDURE — 160025 RECOVERY II MINUTES (STATS): Performed by: ORTHOPAEDIC SURGERY

## 2022-03-31 PROCEDURE — 700102 HCHG RX REV CODE 250 W/ 637 OVERRIDE(OP): Performed by: ANESTHESIOLOGY

## 2022-03-31 PROCEDURE — 160035 HCHG PACU - 1ST 60 MINS PHASE I: Performed by: ORTHOPAEDIC SURGERY

## 2022-03-31 RX ORDER — MIDAZOLAM HYDROCHLORIDE 1 MG/ML
1 INJECTION INTRAMUSCULAR; INTRAVENOUS
Status: DISCONTINUED | OUTPATIENT
Start: 2022-03-31 | End: 2022-03-31 | Stop reason: HOSPADM

## 2022-03-31 RX ORDER — HALOPERIDOL 5 MG/ML
1 INJECTION INTRAMUSCULAR
Status: DISCONTINUED | OUTPATIENT
Start: 2022-03-31 | End: 2022-03-31 | Stop reason: HOSPADM

## 2022-03-31 RX ORDER — SODIUM CHLORIDE, SODIUM LACTATE, POTASSIUM CHLORIDE, CALCIUM CHLORIDE 600; 310; 30; 20 MG/100ML; MG/100ML; MG/100ML; MG/100ML
INJECTION, SOLUTION INTRAVENOUS CONTINUOUS
Status: DISCONTINUED | OUTPATIENT
Start: 2022-03-31 | End: 2022-03-31 | Stop reason: HOSPADM

## 2022-03-31 RX ORDER — CEFAZOLIN SODIUM 1 G/3ML
INJECTION, POWDER, FOR SOLUTION INTRAMUSCULAR; INTRAVENOUS PRN
Status: DISCONTINUED | OUTPATIENT
Start: 2022-03-31 | End: 2022-03-31 | Stop reason: SURG

## 2022-03-31 RX ORDER — OXYCODONE HYDROCHLORIDE 5 MG/1
5 TABLET ORAL EVERY 4 HOURS PRN
Qty: 10 TABLET | Refills: 0 | Status: SHIPPED | OUTPATIENT
Start: 2022-03-31 | End: 2022-04-03

## 2022-03-31 RX ORDER — HYDROMORPHONE HYDROCHLORIDE 1 MG/ML
1 INJECTION, SOLUTION INTRAMUSCULAR; INTRAVENOUS; SUBCUTANEOUS
Status: DISCONTINUED | OUTPATIENT
Start: 2022-03-31 | End: 2022-03-31 | Stop reason: HOSPADM

## 2022-03-31 RX ORDER — CEFAZOLIN SODIUM 1 G/3ML
2 INJECTION, POWDER, FOR SOLUTION INTRAMUSCULAR; INTRAVENOUS ONCE
Status: DISCONTINUED | OUTPATIENT
Start: 2022-03-31 | End: 2022-03-31 | Stop reason: HOSPADM

## 2022-03-31 RX ORDER — MEPERIDINE HYDROCHLORIDE 25 MG/ML
12.5 INJECTION INTRAMUSCULAR; INTRAVENOUS; SUBCUTANEOUS
Status: DISCONTINUED | OUTPATIENT
Start: 2022-03-31 | End: 2022-03-31 | Stop reason: HOSPADM

## 2022-03-31 RX ORDER — SODIUM CHLORIDE, SODIUM GLUCONATE, SODIUM ACETATE, POTASSIUM CHLORIDE AND MAGNESIUM CHLORIDE 526; 502; 368; 37; 30 MG/100ML; MG/100ML; MG/100ML; MG/100ML; MG/100ML
500 INJECTION, SOLUTION INTRAVENOUS CONTINUOUS
Status: DISCONTINUED | OUTPATIENT
Start: 2022-03-31 | End: 2022-03-31 | Stop reason: HOSPADM

## 2022-03-31 RX ORDER — OXYCODONE HCL 5 MG/5 ML
10 SOLUTION, ORAL ORAL
Status: COMPLETED | OUTPATIENT
Start: 2022-03-31 | End: 2022-03-31

## 2022-03-31 RX ORDER — HYDROMORPHONE HYDROCHLORIDE 1 MG/ML
0.4 INJECTION, SOLUTION INTRAMUSCULAR; INTRAVENOUS; SUBCUTANEOUS
Status: DISCONTINUED | OUTPATIENT
Start: 2022-03-31 | End: 2022-03-31 | Stop reason: HOSPADM

## 2022-03-31 RX ORDER — DEXAMETHASONE SODIUM PHOSPHATE 4 MG/ML
INJECTION, SOLUTION INTRA-ARTICULAR; INTRALESIONAL; INTRAMUSCULAR; INTRAVENOUS; SOFT TISSUE PRN
Status: DISCONTINUED | OUTPATIENT
Start: 2022-03-31 | End: 2022-03-31 | Stop reason: SURG

## 2022-03-31 RX ORDER — IPRATROPIUM BROMIDE AND ALBUTEROL SULFATE 2.5; .5 MG/3ML; MG/3ML
3 SOLUTION RESPIRATORY (INHALATION)
Status: DISCONTINUED | OUTPATIENT
Start: 2022-03-31 | End: 2022-03-31 | Stop reason: HOSPADM

## 2022-03-31 RX ORDER — HYDROMORPHONE HYDROCHLORIDE 1 MG/ML
0.2 INJECTION, SOLUTION INTRAMUSCULAR; INTRAVENOUS; SUBCUTANEOUS
Status: DISCONTINUED | OUTPATIENT
Start: 2022-03-31 | End: 2022-03-31 | Stop reason: HOSPADM

## 2022-03-31 RX ORDER — DIPHENHYDRAMINE HYDROCHLORIDE 50 MG/ML
12.5 INJECTION INTRAMUSCULAR; INTRAVENOUS
Status: DISCONTINUED | OUTPATIENT
Start: 2022-03-31 | End: 2022-03-31 | Stop reason: HOSPADM

## 2022-03-31 RX ORDER — ONDANSETRON 2 MG/ML
4 INJECTION INTRAMUSCULAR; INTRAVENOUS
Status: DISCONTINUED | OUTPATIENT
Start: 2022-03-31 | End: 2022-03-31 | Stop reason: HOSPADM

## 2022-03-31 RX ORDER — ROCURONIUM BROMIDE 10 MG/ML
INJECTION, SOLUTION INTRAVENOUS PRN
Status: DISCONTINUED | OUTPATIENT
Start: 2022-03-31 | End: 2022-03-31 | Stop reason: SURG

## 2022-03-31 RX ORDER — LIDOCAINE HYDROCHLORIDE 20 MG/ML
INJECTION, SOLUTION EPIDURAL; INFILTRATION; INTRACAUDAL; PERINEURAL PRN
Status: DISCONTINUED | OUTPATIENT
Start: 2022-03-31 | End: 2022-03-31 | Stop reason: SURG

## 2022-03-31 RX ORDER — ONDANSETRON 2 MG/ML
INJECTION INTRAMUSCULAR; INTRAVENOUS PRN
Status: DISCONTINUED | OUTPATIENT
Start: 2022-03-31 | End: 2022-03-31 | Stop reason: SURG

## 2022-03-31 RX ORDER — KETOROLAC TROMETHAMINE 30 MG/ML
INJECTION, SOLUTION INTRAMUSCULAR; INTRAVENOUS PRN
Status: DISCONTINUED | OUTPATIENT
Start: 2022-03-31 | End: 2022-03-31 | Stop reason: SURG

## 2022-03-31 RX ORDER — MIDAZOLAM HYDROCHLORIDE 1 MG/ML
INJECTION INTRAMUSCULAR; INTRAVENOUS PRN
Status: DISCONTINUED | OUTPATIENT
Start: 2022-03-31 | End: 2022-03-31 | Stop reason: SURG

## 2022-03-31 RX ORDER — BUPIVACAINE HYDROCHLORIDE AND EPINEPHRINE 2.5; 5 MG/ML; UG/ML
INJECTION, SOLUTION EPIDURAL; INFILTRATION; INTRACAUDAL; PERINEURAL
Status: DISCONTINUED | OUTPATIENT
Start: 2022-03-31 | End: 2022-03-31 | Stop reason: HOSPADM

## 2022-03-31 RX ORDER — OXYCODONE HCL 5 MG/5 ML
5 SOLUTION, ORAL ORAL
Status: COMPLETED | OUTPATIENT
Start: 2022-03-31 | End: 2022-03-31

## 2022-03-31 RX ADMIN — OXYCODONE HYDROCHLORIDE 10 MG: 5 SOLUTION ORAL at 14:03

## 2022-03-31 RX ADMIN — ONDANSETRON 4 MG: 2 INJECTION INTRAMUSCULAR; INTRAVENOUS at 13:39

## 2022-03-31 RX ADMIN — CEFAZOLIN 2 G: 330 INJECTION, POWDER, FOR SOLUTION INTRAMUSCULAR; INTRAVENOUS at 13:18

## 2022-03-31 RX ADMIN — MIDAZOLAM HYDROCHLORIDE 2 MG: 1 INJECTION, SOLUTION INTRAMUSCULAR; INTRAVENOUS at 13:16

## 2022-03-31 RX ADMIN — DEXAMETHASONE SODIUM PHOSPHATE 8 MG: 4 INJECTION, SOLUTION INTRAMUSCULAR; INTRAVENOUS at 13:24

## 2022-03-31 RX ADMIN — ROCURONIUM BROMIDE 50 MG: 10 INJECTION, SOLUTION INTRAVENOUS at 13:30

## 2022-03-31 RX ADMIN — FENTANYL CITRATE 50 MCG: 50 INJECTION, SOLUTION INTRAMUSCULAR; INTRAVENOUS at 14:04

## 2022-03-31 RX ADMIN — SODIUM CHLORIDE, POTASSIUM CHLORIDE, SODIUM LACTATE AND CALCIUM CHLORIDE: 600; 310; 30; 20 INJECTION, SOLUTION INTRAVENOUS at 13:15

## 2022-03-31 RX ADMIN — LIDOCAINE HYDROCHLORIDE 50 MG: 20 INJECTION, SOLUTION EPIDURAL; INFILTRATION; INTRACAUDAL; PERINEURAL at 13:30

## 2022-03-31 RX ADMIN — KETOROLAC TROMETHAMINE 30 MG: 30 INJECTION, SOLUTION INTRAMUSCULAR at 13:39

## 2022-03-31 RX ADMIN — SUGAMMADEX 200 MG: 100 INJECTION, SOLUTION INTRAVENOUS at 13:39

## 2022-03-31 RX ADMIN — PROPOFOL 150 MG: 10 INJECTION, EMULSION INTRAVENOUS at 13:30

## 2022-03-31 RX ADMIN — FENTANYL CITRATE 100 MCG: 50 INJECTION, SOLUTION INTRAMUSCULAR; INTRAVENOUS at 13:16

## 2022-03-31 ASSESSMENT — PAIN SCALES - GENERAL: PAIN_LEVEL: 7

## 2022-03-31 ASSESSMENT — PAIN DESCRIPTION - PAIN TYPE
TYPE: ACUTE PAIN
TYPE: SURGICAL PAIN
TYPE: SURGICAL PAIN

## 2022-03-31 ASSESSMENT — FIBROSIS 4 INDEX: FIB4 SCORE: 0.76
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
## 2022-03-31 NOTE — OR NURSING
1345- To PACU from OR via gurney, sleeping, respirations spontaneous and non-labored via OPA with 10L O2 via mask. Icepack applied over c/d/i left knee surgical dressings.Left pedal pulse +2. LLE cap refill 2 seconds. LLE elevated. SCD to RLE. LR infusing via PIV.  1352- Rouses spontaneously. OPA dc'd. Reports no pain or nausea at this time. 1354- Report given to NAVIN Hyman.

## 2022-03-31 NOTE — LETTER
January 10, 2022    Patient Name: Raymond Bledsoe  Surgeon Name: Iftikhar Quezada M.D.  Surgery Facility: [facility] ([address] [facility])  Surgery Date: 2/17/2022    The time of your surgery is not final and may change up to and until the day of your surgery. You will be contacted 24-48 hours prior to your surgery date with your check-in and surgery time.    If you will not be at one of the below numbers please call/text the surgery scheduler at [phone]  Preferred Phone: [phone]    BEFORE YOUR SURGERY  Pre Registration and/or Lab Work must be done within and no earlier than 28 days prior to your surgery date. Please call [facility] at [phone] for an appointment as soon as possible.     Please refrain from smoking any substance after midnight prior to surgery. Smoking may interfere with the anesthetic and frequently produces nausea during the recovery period.    Continue taking all lifesaving medications. Including the morning of your surgery with small sip of water.    Please read the MEDICATION INSTRUCTIONS below completely.    DAY OF YOUR SURGERY  Nothing to eat or drink after midnight     Please arrive at the hospital/surgery center at the check-in time provided.     An adult will need to bring you and take you home after your surgery.     AFTER YOUR SURGERY  Post op Appointment:   Date: 2.28.22   Time: 8 AM   With: Iftikhar Quezada M.D.   Location: [address] [place] [id number]    - Therapy- Your first appointment should be 8  day(s) after your surgery. For your convenience we have 4 Physical Therapy locations: [facility], [place], and [facility]. Call our office ASAP to schedule an appointment at [phone] or take the enclosed Therapy Prescription to a facility of your choice.      TIME OFF WORK  FMLA or Disability forms can be faxed directly to: [phone] or you may drop them off at [address] [place] [address]  [place] [address]. Our office charges a $35.00 fee per form. Forms will be completed within 10 business days of drop off and payment received. For the status of your forms you may contact our disability office directly [phone].    MEDICATION INSTRUCTIONS  The following medications should be stopped a minimum of 10 days prior to surgery:  All over the counter, Supplements & Herbal medications    Anorectics: Phentermine (Adipex-P, Lomaira and Suprenza), Phentermine-topiramate (Qsymia), Bupropion-naltrexone (Contrave)    Opiod Partial Agonists/Opioid Antagonists: Buprenorphine (Subocone, Belbuca, Butrans, Probuphine Implant, Sublocade), Naltrexone (ReVia, Vivitrol), Naloxone    Amphetamines: Dextroamphetamine/Amphetamine (Adderall, Mydayis), Methylphenidate Hydrochloride (Concerta, Metadate, Methylin, Ritalin)    The following medications should be stopped 5 days prior to surgery:  Blood Thinners: Any Aspirin, Aspirin products, anti-inflammatories such as ibuprofen and any blood thinners such as Coumadin and Plavix. Please consult your prescribing physician if you are on life saving blood thinners, in regards to when to stop medications prior to surgery.     The following medications should be stopped a minimum of 3 days prior to surgery:  PDE-5 inhibitors: Sildenafil (Viagra), Tadalafil (Cialis), Vardenafil (Levitra), Avanafil (Stendra)    MAO Inhibitors: Rasagiline (Azilect), Selegiline (Eldepryl, Emsam, Selapar), Isocarboxazid (Marplan), Phenelzine (Nardil)

## 2022-03-31 NOTE — OR NURSING
1355: Pt awake and conversing. Denies pain or nausea.    1400: Pt now states pain is 7/10, and is requesting pain medication.    1410: Pain is currently tolerable at 3/10.    1433: Meets criteria for stage ll.

## 2022-03-31 NOTE — H&P
Surgery Orthopedic History & Physical Note    Date  3/31/2022    Primary Care Physician  Lulu Renner M.D.      Pre-Op Diagnosis Codes:     * Acute lateral meniscus tear of right knee, sequela [S83.281S]    HPI  This is a 26 y.o. male who presented with left knee pain    Past Medical History:   Diagnosis Date   • Bradycardia     50's sometimes 51   • COVID-19 01/15/2022   • Depression    • Infectious disease     Covid exposure 1/18.  Stomach pain, chills, HA, cough.  Continues to have congestion.  Positive test 1/18 at [facility]       Past Surgical History:   Procedure Laterality Date   • PB KNEE SCOPE,DIAGNOSTIC Right 6/4/2021    Procedure: ARTHROSCOPY, KNEE;  Surgeon: Iftikhar Quezada M.D.;  Location: SURGERY [facility];  Service: Orthopedics   • MENISCECTOMY, KNEE, MEDIAL Right 6/4/2021    Procedure: MENISCECTOMY, KNEE, MEDIAL - FOR PARTIAL LATERAL.;  Surgeon: Iftikhar Quezada M.D.;  Location: SURGERY [facility];  Service: Orthopedics       Current Facility-Administered Medications   Medication Dose Route Frequency Provider Last Rate Last Admin   • ceFAZolin (ANCEF) injection 2 g  2 g Intravenous Once JODIE BelloAAlejandro-STEPHAN       • lidocaine (XYLOCAINE) 1 % injection 0.5 mL  0.5 mL Intradermal Once PRN Iftikhar Quezada M.D.       • lactated ringers infusion   Intravenous Continuous Iftikhar Quezada M.D.           Social History     Socioeconomic History   • Marital status: Single     Spouse name: Not on file   • Number of children: Not on file   • Years of education: Not on file   • Highest education level: Some college, no degree   Occupational History   • Not on file   Tobacco Use   • Smoking status: Never Smoker   • Smokeless tobacco: Never Used   Vaping Use   • Vaping Use: Never used   Substance and Sexual Activity   • Alcohol use: Yes     Alcohol/week: 0.6 oz     Types: 1 Cans of beer per week   • Drug use: Not Currently     Frequency: 7.0 times per week     Types: Marijuana, Inhaled, Oral     Comment: 3-4  times per week   • Sexual activity: Yes     Partners: Female   Other Topics Concern   • Not on file   Social History Narrative   • Not on file     Social Determinants of Health     Financial Resource Strain: High Risk   • Difficulty of Paying Living Expenses: Hard   Food Insecurity: Food Insecurity Present   • Worried About Running Out of Food in the Last Year: Sometimes true   • Ran Out of Food in the Last Year: Sometimes true   Transportation Needs: No Transportation Needs   • Lack of Transportation (Medical): No   • Lack of Transportation (Non-Medical): No   Physical Activity: Sufficiently Active   • Days of Exercise per Week: 6 days   • Minutes of Exercise per Session: 100 min   Stress: Stress Concern Present   • Feeling of Stress : To some extent   Social Connections: Moderately Isolated   • Frequency of Communication with Friends and Family: More than three times a week   • Frequency of Social Gatherings with Friends and Family: Twice a week   • Attends [employer] Services: 1 to 4 times per year   • Active Member of Clubs or Organizations: No   • Attends Club or Organization Meetings: Patient refused   • Marital Status: Never    Intimate Partner Violence: Not on file   Housing Stability: High Risk   • Unable to Pay for Housing in the Last Year: Yes   • Number of Places Lived in the Last Year: 3   • Unstable Housing in the Last Year: No       Family History   Problem Relation Age of Onset   • Psychiatric Illness Mother    • Stroke Maternal Aunt    • Psychiatric Illness Maternal Aunt    • Psychiatric Illness Maternal Uncle    • Cancer Neg Hx    • Diabetes Neg Hx        Allergies  Cat hair extract    Review of Systems  Negative    Physical Exam    Vital Signs  Blood Pressure: 117/63   Temperature: (!) 34.6 °C (94.3 °F)   Pulse: 64   Respiration: 18   Pulse Oximetry: 97 %       Labs:  Recent Labs     03/28/22  1445   WBC 7.6   RBC 5.28   HEMOGLOBIN 16.2   HEMATOCRIT 46.7   MCV 88.4   MCH 30.7   MCHC 34.7    RDW 41.8   PLATELETCT 136*   MPV 10.1     Recent Labs     03/28/22  1445   SODIUM 137   POTASSIUM 3.7   CHLORIDE 101   CO2 25   GLUCOSE 96   BUN 10   CREATININE 1.13   CALCIUM 9.3         Recent Labs     03/28/22  1445   ASTSGOT 22   ALTSGPT 26   TBILIRUBIN 1.4   ALKPHOSPHAT 76   GLOBULIN 2.6       Radiology:  No orders to display         Assessment/Plan:  Pre-Op Diagnosis Codes:     * Acute lateral meniscus tear of right knee, sequela [S83.281S]  Procedure(s):  Left knee arthroscopic partial lateral meniscectomy

## 2022-03-31 NOTE — ANESTHESIA PROCEDURE NOTES
Airway    Date/Time: 3/31/2022 1:21 PM  Performed by: Cipriano Ruiz III, M.D.  Authorized by: Cipriano Ruiz III, M.D.     Location:  OR  Urgency:  Elective  Indications for Airway Management:  Anesthesia      Spontaneous Ventilation: absent    Sedation Level:  Deep  Preoxygenated: Yes    MILS Maintained Throughout: No    Final Airway Type:  Supraglottic airway  Final Supraglottic Airway:  Standard LMA    SGA Size:  4  Number of Attempts at Approach:  1

## 2022-03-31 NOTE — OP REPORT
SURGEON: Iftikhar Quezada M.D.    PREOPERATIVE DIAGNOSIS: Left knee lateral meniscus tear     POSTOPERATIVE DIAGNOSES:  1. Left knee complex tear of the anterior and middle horn of the lateral meniscus    PROCEDURES PERFORMED:  1. Left knee diagnostic arthroscopy with arthroscopic partial lateral meniscectomy    ASSISTANT: Ira Wood PA-C    ANESTHESIA: General.    ANESTHESIOLOGIST: Luc Ruiz MD    IMPLANTS: None.    COMPLICATIONS: None.    DISPOSITION: Stable to the Post Anesthesia Care Unit.    INDICATIONS: The patient has had progressive knee pain, which has been unresponsive to conservative management.The risks, benefits, alternatives, and limitations of surgical intervention were discussed in detail. They have expressed understanding and desire to proceed.    PROCEDURE IN DETAIL: The patient and the correct operative extremity were identified in the preoperative area. The knee was marked. The patient was brought to the operating room where the correct operative extremity was again confirmed. They were placed supine on the OR table and then underwent general anesthesia without complication. Examination under anesthesia showed full range of motion, no instability. The knee was prepped with alcohol and injected with 1% lidocaine with epinephrine. The knee was then prepped and draped in the usual sterile fashion using ChloraPrep.    A diagnostic arthroscopy was then performed which showed intact cartilage of the patella and intact cartilage of the trochlea.The notch showed an intact ACL and PCL.The medial compartment showed an intact meniscus and intact cartilage.  The lateral compartment showed a complex tear of the anterior horn of the lateral meniscus extending into the middle horn with a large unstable flap fragment. A partial lateral meniscectomy was performed with the duckbill resector and the arthroscopic shaver.    A spinal needle then placed into the suprapatellar pouch under direct vision, the fluid  removed from the knee, the scope withdrawn, the portals closed with 3-0 nylon, and the knee injected with 0.5% bupivacaine with epinephrine. Sterile dressings were applied. The knee was loosely overwrapped with an ACE wrap. The patient was then allowed to awake from anesthesia, transferred to his hospital cart, and taken to the Post Anesthesia Care Unit in stable condition. They tolerated the procedure well. There were no immediate complications.

## 2022-03-31 NOTE — DISCHARGE INSTRUCTIONS
ACTIVITY: Rest and take it easy for the first 24 hours.  A responsible adult is recommended to remain with you during that time.  It is normal to feel sleepy.  We encourage you to not do anything that requires balance, judgment or coordination.    MILD FLU-LIKE SYMPTOMS ARE NORMAL. YOU MAY EXPERIENCE GENERALIZED MUSCLE ACHES, THROAT IRRITATION, HEADACHE AND/OR SOME NAUSEA.    FOR 24 HOURS DO NOT:  Drive, operate machinery or run household appliances.  Drink beer or alcoholic beverages.   Make important decisions or sign legal documents.    SPECIAL INSTRUCTIONS:   Keep dressings on for 2 days.    After two days, you may shower with wounds uncovered using normal soap and water.  Apply band aids to wounds after shower.   DO NOT SUBMERGE INCISIONS for 3 weeks.    Ice and elevate the extremity.   Weight bear as tolerated and use crutches for comfort.   Ice and elevate extremity.    DIET: To avoid nausea, slowly advance diet as tolerated, avoiding spicy or greasy foods for the first day.  Add more substantial food to your diet according to your physician's instructions. INCREASE FLUIDS AND FIBER TO AVOID CONSTIPATION.    FOLLOW-UP APPOINTMENT:  A follow-up appointment should be arranged with your doctor; call to schedule.    You should CALL YOUR PHYSICIAN if you develop:  Fever greater than 101 degrees F.  Pain not relieved by medication, or persistent nausea or vomiting.  Excessive bleeding (blood soaking through dressing) or unexpected drainage from the wound.  Extreme redness or swelling around the incision site, drainage of pus or foul smelling drainage.  Inability to urinate or empty your bladder within 8 hours.    You should call 911 if you develop problems with breathing or chest pain.    If you are unable to contact your doctor or surgical center, you should go to the nearest emergency room or urgent care center.      Dr. Quezada's telephone #: [phone]    If any questions arise, call your doctor.  If your  doctor is not available, please feel free to call the Surgical Center at [phone].     A registered nurse may call you a few days after your surgery to see how you are doing after your procedure.    MEDICATIONS: Resume taking daily medication.  Take prescribed pain medication with food.  If no medication is prescribed, you may take non-aspirin pain medication if needed.  PAIN MEDICATION CAN BE VERY CONSTIPATING.  Take a stool softener or laxative such as senokot, pericolace, or milk of magnesia if needed.      these medications from [facility] - [place], [place] - [address]  1 oxyCODONE immediate-release        Last pain medication (Oxycodone 10 mg) given at 2:00 pm.    If your physician has prescribed pain medication that includes Acetaminophen (Tylenol), do not take additional Acetaminophen (Tylenol) while taking the prescribed medication.    Depression / Suicide Risk    As you are discharged from this [facility] facility, it is important to learn how to keep safe from harming yourself.    Recognize the warning signs:  · Abrupt changes in personality, positive or negative- including increase in energy   · Giving away possessions  · Change in eating patterns- significant weight changes-  positive or negative  · Change in sleeping patterns- unable to sleep or sleeping all the time   · Unwillingness or inability to communicate  · Depression  · Unusual sadness, discouragement and loneliness  · Talk of wanting to die  · Neglect of personal appearance   · Rebelliousness- reckless behavior  · Withdrawal from people/activities they love  · Confusion- inability to concentrate     If you or a loved one observes any of these behaviors or has concerns about self-harm, here's what you can do:  · Talk about it- your feelings and reasons for harming yourself  · Remove any means that you might use to hurt yourself (examples: pills, rope, extension cords, firearm)  · Get professional help from the community  (Mental Health, Substance Abuse, psychological counseling)  · Do not be alone:Call your Safe Contact- someone whom you trust who will be there for you.  · Call your local CRISIS HOTLINE 799-5222 or 146-601-9253  · Call your local Children's Mobile Crisis Response Team [place] (942) 409-9982 or www.TriplePulse  · Call the toll free National Suicide Prevention Hotlines   · National Suicide Prevention Lifeline 750-310-USAP (7546)  · National Hope Line Network 800-SUICIDE (628-1459)

## 2022-03-31 NOTE — ANESTHESIA POSTPROCEDURE EVALUATION
Patient: Raymond Bledsoe    Procedure Summary     Date: 03/31/22 Room / Location:  OR  / SURGERY [facility]    Anesthesia Start: 1315 Anesthesia Stop: 1349    Procedure: Left knee arthroscopic partial lateral meniscectomy (Left Knee) Diagnosis:       Acute lateral meniscus tear of left knee, sequela      (Acute lateral meniscus tear of left knee, sequela [S83.281S])    Surgeons: Iftikhar Quezada M.D. Responsible Provider: Cipriano Ruiz III, M.D.    Anesthesia Type: general ASA Status: 2          Final Anesthesia Type: general  Last vitals  BP   Blood Pressure: 122/62    Temp   36.3 °C (97.3 °F)    Pulse   (!) 58   Resp   12    SpO2   100 %      Anesthesia Post Evaluation    Patient location during evaluation: PACU  Patient participation: complete - patient participated  Level of consciousness: awake and alert  Pain score: 7  Acute post op pain being well managed by recovery room staff  Airway patency: patent  Anesthetic complications: no  Cardiovascular status: hemodynamically stable  Respiratory status: acceptable  Hydration status: euvolemic    PONV: none          No complications documented.     Nurse Pain Score: 7 (NPRS)

## 2022-03-31 NOTE — ANESTHESIA TIME REPORT
Anesthesia Start and Stop Event Times     Date Time Event    3/31/2022 1250 Ready for Procedure     1315 Anesthesia Start     1349 Anesthesia Stop        Responsible Staff  03/31/22    Name Role Begin End    Cipriano Ruiz III, M.D. Anesth 1315 1349        Preop Diagnosis (Free Text):  Pre-op Diagnosis     Acute lateral meniscus tear of left knee, sequela [S83.281S]        Preop Diagnosis (Codes):  Diagnosis Information     Diagnosis Code(s): Acute lateral meniscus tear of left knee, sequela [S83.282S]        Premium Reason  Non-Premium    Comments:

## 2022-03-31 NOTE — LETTER
January 26, 2022    Patient Name: Raymond Bledsoe  Surgeon Name: Iftikhar Quezada M.D.  Surgery Facility: [facility] ([address] [facility])  Surgery Date: 3/31/2022    The time of your surgery is not final and may change up to and until the day of your surgery. You will be contacted 24-48 hours prior to your surgery date with your check-in and surgery time.    If you will not be at one of the below numbers please call/text the surgery scheduler at [phone]  Preferred Phone: [phone]    BEFORE YOUR SURGERY  Pre Registration and/or Lab Work must be done within and no earlier than 28 days prior to your surgery date. Please call [facility] at [phone] for an appointment as soon as possible.     Not scheduled at [facility] contact the scheduled facility for approved testing facilities.    Please refrain from smoking any substance after midnight prior to surgery. Smoking may interfere with the anesthetic and frequently produces nausea during the recovery period.    Continue taking all lifesaving medications. Including the morning of your surgery with small sip of water.    Please read the MEDICATION INSTRUCTIONS below completely.    DAY OF YOUR SURGERY  Nothing to eat or drink after midnight     Please arrive at the hospital/surgery center at the check-in time provided.     An adult will need to bring you and take you home after your surgery.     AFTER YOUR SURGERY  Post op Appointment:   Date: 04/04/2022   Time: 10:00 AM    With: Iftikhar Quezada M.D.   Location: [address] [place] [address] [place] [address]    - Therapy- Your first appointment should be 8  day(s) after your surgery. For your convenience we have 4 Physical Therapy locations: [facility], [place], and [facility]. Call our office ASAP to schedule an appointment at [phone] or take the enclosed Therapy Prescription to a facility of your choice.    TIME OFF WORK  FMLA  or Disability forms can be faxed directly to: [phone] or you may drop them off at [address] [place] [address]. Our office charges a $35.00 fee per form. Forms will be completed within 10 business days of drop off and payment received. For the status of your forms you may contact our disability office directly [phone].    MEDICATION INSTRUCTIONS  The following medications should be stopped a minimum of 10 days prior to surgery:  All over the counter, Supplements & Herbal medications    Anorectics: Phentermine (Adipex-P, Lomaira and Suprenza), Phentermine-topiramate (Qsymia), Bupropion-naltrexone (Contrave)    Opiod Partial Agonists/Opioid Antagonists: Buprenorphine (Subocone, Belbuca, Butrans, Probuphine Implant, Sublocade), Naltrexone (ReVia, Vivitrol), Naloxone    Amphetamines: Dextroamphetamine/Amphetamine (Adderall, Mydayis), Methylphenidate Hydrochloride (Concerta, Metadate, Methylin, Ritalin)    The following medications should be stopped 5 days prior to surgery:  Blood Thinners: Any Aspirin, Aspirin products, anti-inflammatories such as ibuprofen and any blood thinners such as Coumadin and Plavix. Please consult your prescribing physician if you are on life saving blood thinners, in regards to when to stop medications prior to surgery.     The following medications should be stopped a minimum of 3 days prior to surgery:  PDE-5 inhibitors: Sildenafil (Viagra), Tadalafil (Cialis), Vardenafil (Levitra), Avanafil (Stendra)    MAO Inhibitors: Rasagiline (Azilect), Selegiline (Eldepryl, Emsam, Selapar), Isocarboxazid (Marplan), Phenelzine (Nardil)

## 2022-08-11 ENCOUNTER — NON-PROVIDER VISIT (OUTPATIENT)
Dept: URGENT CARE | Facility: CLINIC | Age: 27
End: 2022-08-11

## 2022-08-11 DIAGNOSIS — Z02.1 PRE-EMPLOYMENT DRUG SCREENING: ICD-10-CM

## 2022-08-11 DIAGNOSIS — Z02.83 EMPLOYMENT-RELATED DRUG TESTING, ENCOUNTER FOR: Primary | ICD-10-CM

## 2022-08-11 PROCEDURE — 80305 DRUG TEST PRSMV DIR OPT OBS: CPT | Performed by: PHYSICIAN ASSISTANT

## 2025-06-04 ENCOUNTER — APPOINTMENT (OUTPATIENT)
Dept: URGENT CARE | Facility: CLINIC | Age: 30
End: 2025-06-04
Payer: COMMERCIAL

## 2025-07-16 ENCOUNTER — NON-PROVIDER VISIT (OUTPATIENT)
Dept: OCCUPATIONAL MEDICINE | Facility: CLINIC | Age: 30
End: 2025-07-16

## 2025-07-16 DIAGNOSIS — Z02.1 PRE-EMPLOYMENT DRUG SCREENING: Primary | ICD-10-CM

## 2025-07-16 PROCEDURE — 80305 DRUG TEST PRSMV DIR OPT OBS: CPT | Performed by: NURSE PRACTITIONER

## (undated) DEVICE — SUCTION INSTRUMENT YANKAUER BULBOUS TIP W/O VENT (50EA/CA)

## (undated) DEVICE — SODIUM CHL. IRRIGATION 0.9% 3000ML (4EA/CA 65CA/PF)

## (undated) DEVICE — CHLORAPREP 26 ML APPLICATOR - ORANGE TINT(25/CA)

## (undated) DEVICE — GLOVE, LITE (PAIR)

## (undated) DEVICE — MASK ANESTHESIA ADULT  - (100/CA)

## (undated) DEVICE — GLOVE SZ 7.5 LF PROTEXIS (50PR/BX)

## (undated) DEVICE — BAG SPONGE COUNT 10.25 X 32 - BLUE (250/CA)

## (undated) DEVICE — GOWN WARMING STANDARD FLEX - (30/CA)

## (undated) DEVICE — PACK KNEE ARTHROSCOPY SM OR - (2EA/CA)

## (undated) DEVICE — HEAD HOLDER JUNIOR/ADULT

## (undated) DEVICE — WATER IRRIGATION STERILE 1000ML (12EA/CA)

## (undated) DEVICE — TOWEL STOP TIMEOUT SAFETY FLAG (40EA/CA)

## (undated) DEVICE — KIT ANESTHESIA W/CIRCUIT & 3/LT BAG W/FILTER (20EA/CA)

## (undated) DEVICE — GLOVE BIOGEL INDICATOR SZ 8 SURGICAL PF LTX - (50/BX 4BX/CA)

## (undated) DEVICE — DRAPE LOWER EXTREMETY - (6/CA)

## (undated) DEVICE — BLADE SHAVER AGGRESSIVE PLUS 4.0MM ANGLED (5EA/BX)

## (undated) DEVICE — TUBING PATIENT W/CONNECTOR REDEUCE (1EA)

## (undated) DEVICE — ABLATOR WAND SERFAS 90-S CRUISE

## (undated) DEVICE — SENSOR SPO2 NEO LNCS ADHESIVE (20/BX) SEE USER NOTES

## (undated) DEVICE — LACTATED RINGERS INJ 1000 ML - (14EA/CA 60CA/PF)

## (undated) DEVICE — HUMID-VENT HEAT AND MOISTURE EXCHANGE- (50/BX)

## (undated) DEVICE — PROTECTOR ULNA NERVE - (36PR/CA)

## (undated) DEVICE — DRAPE U SPLIT IMP 54 X 76 - (24/CA)

## (undated) DEVICE — GLOVE BIOGEL SZ 7 SURGICAL PF LTX - (50PR/BX 4BX/CA)

## (undated) DEVICE — SUTURE GENERAL

## (undated) DEVICE — TUBING PUMP WITH CONNECTOR REDEUCE (1EA)

## (undated) DEVICE — NEEDLE SAFETY 18 GA X 1 1/2 IN (100EA/BX)

## (undated) DEVICE — CANISTER SUCTION RIGID RED 1500CC (40EA/CA)

## (undated) DEVICE — SUTURE 3-0 ETHILON FS-1 - (36/BX) 30 INCH

## (undated) DEVICE — ELECTRODE 850 FOAM ADHESIVE - HYDROGEL RADIOTRNSPRNT (50/PK)

## (undated) DEVICE — DRAPE LARGE 3 QUARTER - (20/CA)

## (undated) DEVICE — SYRINGE 30 ML LL (56/BX)

## (undated) DEVICE — ELECTRODE DUAL RETURN W/ CORD - (50/PK)

## (undated) DEVICE — NEPTUNE 4 PORT MANIFOLD - (20/PK)

## (undated) DEVICE — SODIUM CHL IRRIGATION 0.9% 1000ML (12EA/CA)

## (undated) DEVICE — TUBE CONNECTING SUCTION - CLEAR PLASTIC STERILE 72 IN (50EA/CA)

## (undated) DEVICE — GLOVE BIOGEL INDICATOR SZ 7.5 SURGICAL PF LTX - (50PR/BX 4BX/CA)